# Patient Record
Sex: MALE | Race: BLACK OR AFRICAN AMERICAN
[De-identification: names, ages, dates, MRNs, and addresses within clinical notes are randomized per-mention and may not be internally consistent; named-entity substitution may affect disease eponyms.]

---

## 2022-02-28 ENCOUNTER — HOSPITAL ENCOUNTER (OUTPATIENT)
Dept: HOSPITAL 97 - ER | Age: 72
Setting detail: OBSERVATION
LOS: 1 days | Discharge: HOME | End: 2022-03-01
Attending: INTERNAL MEDICINE | Admitting: INTERNAL MEDICINE
Payer: COMMERCIAL

## 2022-02-28 VITALS — BODY MASS INDEX: 25.4 KG/M2

## 2022-02-28 DIAGNOSIS — J90: ICD-10-CM

## 2022-02-28 DIAGNOSIS — I10: ICD-10-CM

## 2022-02-28 DIAGNOSIS — E11.9: ICD-10-CM

## 2022-02-28 DIAGNOSIS — Z20.822: ICD-10-CM

## 2022-02-28 DIAGNOSIS — R07.89: Primary | ICD-10-CM

## 2022-02-28 DIAGNOSIS — E78.5: ICD-10-CM

## 2022-02-28 LAB
ALBUMIN SERPL BCP-MCNC: 3.9 G/DL (ref 3.4–5)
ALP SERPL-CCNC: 106 U/L (ref 45–117)
ALT SERPL W P-5'-P-CCNC: 24 U/L (ref 12–78)
AST SERPL W P-5'-P-CCNC: 11 U/L (ref 15–37)
BUN BLD-MCNC: 17 MG/DL (ref 7–18)
GLUCOSE SERPLBLD-MCNC: 179 MG/DL (ref 74–106)
HCT VFR BLD CALC: 46.3 % (ref 39.6–49)
INR BLD: 1.1
LYMPHOCYTES # SPEC AUTO: 5.1 K/UL (ref 0.7–4.9)
MAGNESIUM SERPL-MCNC: 2.4 MG/DL (ref 1.8–2.4)
NT-PROBNP SERPL-MCNC: 22 PG/ML (ref ?–125)
PMV BLD: 8.6 FL (ref 7.6–11.3)
POTASSIUM SERPL-SCNC: 4.3 MMOL/L (ref 3.5–5.1)
RBC # BLD: 5.16 M/UL (ref 4.33–5.43)
SARS-COV-2 RNA RESP QL NAA+PROBE: NEGATIVE
TROPONIN I SERPL HS-MCNC: 5.4 PG/ML (ref ?–58.9)

## 2022-02-28 PROCEDURE — 71045 X-RAY EXAM CHEST 1 VIEW: CPT

## 2022-02-28 PROCEDURE — 85025 COMPLETE CBC W/AUTO DIFF WBC: CPT

## 2022-02-28 PROCEDURE — 93005 ELECTROCARDIOGRAM TRACING: CPT

## 2022-02-28 PROCEDURE — 82947 ASSAY GLUCOSE BLOOD QUANT: CPT

## 2022-02-28 PROCEDURE — 99285 EMERGENCY DEPT VISIT HI MDM: CPT

## 2022-02-28 PROCEDURE — 96374 THER/PROPH/DIAG INJ IV PUSH: CPT

## 2022-02-28 PROCEDURE — 96375 TX/PRO/DX INJ NEW DRUG ADDON: CPT

## 2022-02-28 PROCEDURE — 85610 PROTHROMBIN TIME: CPT

## 2022-02-28 PROCEDURE — 71275 CT ANGIOGRAPHY CHEST: CPT

## 2022-02-28 PROCEDURE — 83735 ASSAY OF MAGNESIUM: CPT

## 2022-02-28 PROCEDURE — 82565 ASSAY OF CREATININE: CPT

## 2022-02-28 PROCEDURE — 80053 COMPREHEN METABOLIC PANEL: CPT

## 2022-02-28 PROCEDURE — 80048 BASIC METABOLIC PNL TOTAL CA: CPT

## 2022-02-28 PROCEDURE — 82550 ASSAY OF CK (CPK): CPT

## 2022-02-28 PROCEDURE — 80076 HEPATIC FUNCTION PANEL: CPT

## 2022-02-28 PROCEDURE — 36415 COLL VENOUS BLD VENIPUNCTURE: CPT

## 2022-02-28 PROCEDURE — 76705 ECHO EXAM OF ABDOMEN: CPT

## 2022-02-28 PROCEDURE — 82553 CREATINE MB FRACTION: CPT

## 2022-02-28 PROCEDURE — 0240U: CPT

## 2022-02-28 PROCEDURE — 83880 ASSAY OF NATRIURETIC PEPTIDE: CPT

## 2022-02-28 PROCEDURE — 84484 ASSAY OF TROPONIN QUANT: CPT

## 2022-02-28 RX ADMIN — CEFTRIAXONE SCH MLS: 1 INJECTION, POWDER, FOR SOLUTION INTRAMUSCULAR; INTRAVENOUS at 18:00

## 2022-02-28 RX ADMIN — COLCHICINE SCH MG: 0.6 TABLET, FILM COATED ORAL at 23:29

## 2022-02-28 RX ADMIN — HUMAN INSULIN SCH UNIT: 100 INJECTION, SOLUTION SUBCUTANEOUS at 21:00

## 2022-02-28 RX ADMIN — Medication SCH ML: at 21:00

## 2022-02-28 RX ADMIN — ENOXAPARIN SODIUM SCH MG: 40 INJECTION SUBCUTANEOUS at 18:00

## 2022-02-28 NOTE — EDPHYS
Physician Documentation                                                                           

 [facility]                                                                 

Name: Jozef Langley                                                                                

Age: 71 yrs                                                                                       

Sex: Male                                                                                         

: 1950                                                                                   

MRN: [id number]                                                                                   

Arrival Date: 2022                                                                          

Time: 13:33                                                                                       

Account#: [id number]                                                                            

Bed 6                                                                                             

Private MD: Sharda Babb                                                                              

ED Physician Leonid Narayanan                                                                      

HPI:                                                                                              

                                                                                             

13:56 This 71 yrs old Black Male presents to ER via Ambulatory with complaints of Chest Pain. jmm 

13:56 The patient or guardian reports chest pain that is located primarily in the substernal  [facility] 

      area. Onset: gradually, 3 day(s) ago. The pain radiates to Right-sided. Associated          

      signs and symptoms: Pertinent positives: shortness of breath. This is a 71-year-old         

      male with history of diabetes mellitus, hypertension the presents emerged part with         

      complaints of right-sided chest pain beginning approximately 3 to 4 days ago. Patient       

      states that the pain radiates into the mid substernal region. Patient is a      

      states he has chronic lower extremity swelling . Patient is never seen a cardiologist       

      with his PCP..                                                                              

                                                                                                  

Historical:                                                                                       

- Allergies:                                                                                      

13:45 No Known Allergies;                                                                     [facility] 

- PMHx:                                                                                           

13:45 Diabetes mellitus; Hypertensive disorder;                                               [facility] 

                                                                                                  

- Immunization history:: Client reports receiving the 2nd dose of the Covid vaccine.              

- Social history:: Smoking status: Patient denies any tobacco usage or history of.                

                                                                                                  

                                                                                                  

ROS:                                                                                              

13:56 Constitutional: Negative for fever, chills, and weight loss.                            jmm 

13:56 Cardiovascular: Positive for chest pain.                                                    

13:56 Respiratory: Positive for shortness of breath.                                              

13:56 All other systems are negative.                                                             

                                                                                                  

Exam:                                                                                             

13:56 Constitutional:  This is a well developed, well nourished patient who is awake, alert,  jmm 

      and in no acute distress. Head/Face:  atraumatic. Eyes:  EOMI, no conjunctival erythema     

      appreciated ENT:  Moist Mucus Membranes Neck:  Trachea midline, Supple Cardiovascular:      

      Regular rate and rhythm.  No edema appreciated                                              

13:56 Respiratory:  Normal respirations, no respiratory distress appreciated Abdomen/GI:  Non     

      distended, soft Back:  Normal ROM Skin:  General appearance color normal MS/ Extremity:     

       Moves all extremities, no obvious deformities appreciated, no edema noted to the lower     

      extremities  Neuro:  Awake and alert Psych:  Behavior is normal, Mood is normal,            

      Patient is cooperative and pleasant                                                         

13:56 Chest/axilla: Palpation: tenderness, that is moderate, of the  right lateral anterior       

      chest.                                                                                      

                                                                                                  

Vital Signs:                                                                                      

13:42  / 81; Pulse 122; Resp 20; Temp 97.9(TE); Pulse Ox 100% ; Weight 83.01 kg; Height 6 

      5 ft. 8 in. (172.72 cm); Pain 5/10;                                                         

14:00  / 78; Pulse 116; Resp 18; Temp 98.5; Pulse Ox 97% on R/A;                        ke1 

16:00  / 82; Pulse 103; Resp 22; Pulse Ox 99% on R/A;                                   ke1 

17:30  / 101; Pulse 92; Resp 19; Pulse Ox 99% on R/A;                                   ke1 

18:30  / 71; Pulse 103; Resp 16; Pulse Ox 98% on R/A;                                   st1 

20:20  / 77; Pulse 95; Resp 16; Pulse Ox 95% on R/A;                                    st1 

22:21  / 81; Pulse 88; Resp 16; Pulse Ox 99% on R/A;                                    st1 

13:42 Body Mass Index 27.82 (83.01 kg, 172.72 cm)                                             jh6 

                                                                                                  

MDM:                                                                                              

13:56 Patient medically screened.                                                             jake 

16:44 Data reviewed: vital signs, nurses notes. Counseling: I had a detailed discussion with  johanna 

      the patient and/or guardian regarding: the historical points, exam findings, and any        

      diagnostic results supporting the discharge/admit diagnosis, lab results, radiology         

      results, the need for further work-up and treatment in the hospital. ED course: I           

      discussed the patient with Dr. Walden whom accepted the patient to his service. .         

                                                                                                  

                                                                                             

14:05 Order name: Basic Metabolic Panel; Complete Time: 15:20                                 [facility] 

                                                                                             

14:05 Order name: CBC with Diff; Complete Time: 15:20                                         [facility] 

                                                                                             

14:05 Order name: LFT's; Complete Time: 15:20                                                 [facility] 

                                                                                             

14:05 Order name: Magnesium; Complete Time: 15:20                                             [facility] 

                                                                                             

14:05 Order name: NT PRO-BNP; Complete Time: 15:20                                            [facility] 

                                                                                             

14:05 Order name: PT-INR; Complete Time: 15:20                                                [facility] 

                                                                                             

14:05 Order name: Troponin HS; Complete Time: 15:20                                           [facility] 

                                                                                             

14:05 Order name: XRAY Chest (1 view); Complete Time: 15:21                                   [facility] 

                                                                                             

14:06 Order name: COVID-19/FLU A+B (Document "Date of Onset" if Symptomatic)                  [facility] 

                                                                                             

15:25 Order name: CREATININE WHOLE BLOOD; Complete Time: 15:43                                [facility]

                                                                                             

16:25 Order name: SARS-COV-2 RT PCR (Document "Date of Onset" if Symptomatic)                 [facility] 

                                                                                             

16:55 Order name: CKMB Creatine Kinase MB                                                     [facility]

                                                                                             

16:55 Order name: Creatine Phosphokinase                                                      [facility]

                                                                                             

22:56 Order name: Glucose, Ancillary Testing                                                  [facility]

                                                                                             

14:05 Order name: EKG; Complete Time: 14:06                                                   [facility] 

                                                                                             

14:05 Order name: Cardiac monitoring; Complete Time: 14:06                                    [facility] 

                                                                                             

14:06 Order name: EKG - Nurse/Tech; Complete Time: 14:06                                      [facility] 

                                                                                             

14:06 Order name: IV Saline Lock; Complete Time: 14:55                                        [facility] 

                                                                                             

14:06 Order name: Labs collected and sent; Complete Time: 14:55                               [facility] 

                                                                                             

14:06 Order name: O2 Per Protocol; Complete Time: 14:06                                       [facility] 

                                                                                             

14:06 Order name: O2 Sat Monitoring; Complete Time: 14:06                                     [facility] 

                                                                                             

14:18 Order name: CT Chest For PE Angio; Complete Time: 15:20                                 [facility] 

                                                                                             

15:57 Order name: US Abdomen Limited; Complete Time: 16:48                                    [facility] 

                                                                                             

16:55 Order name: 60g Consistent Carbohydrate (ADA 1800/)                                 [facility]

                                                                                                  

Administered Medications:                                                                         

14:54 Drug: morphine 2 mg Route: IVP; Site: left antecubital;                                 ke1 

15:15 Follow up: Response: Pain is decreased                                                  ke1 

14:54 Drug: Zofran (Ondansetron) 4 mg Route: IVP; Site: left antecubital;                     ke1 

15:15 Follow up: Response: No adverse reaction                                                ke1 

16:06 Drug: morphine 4 mg Route: IVP; Site: left antecubital;                                 ke1 

17:33 Follow up: Response: Pain is decreased; RASS: Alert and Calm (0)                        ke1 

17:29 Drug: Aspirin Chewable Tablet 324 mg Route: PO;                                         ke1 

                                                                                                  

                                                                                                  

Disposition Summary:                                                                              

22 16:45                                                                                    

Hospitalization Ordered                                                                           

      Hospitalization Status: Observation                                                     [facility] 

      Provider: Juan Walden 

      Condition: Stable                                                                       jmm 

      Problem: new                                                                            jmm 

      Symptoms: are unchanged                                                                 jmm 

      Bed/Room Type: Standard                                                                 jmm 

      Location: Telemetry/MedSurg (observation)(22 21:04)                               mw  

      Room Assignment: 419(22 21:04)                                                      

      Diagnosis                                                                                   

        - Chest pain, unspecified                                                             jmm 

      Forms:                                                                                      

        - Medication Reconciliation Form                                                      jmm 

        - SBAR form                                                                           jmm 

Addendum:                                                                                         

2022                                                                                        

     09:24 Co-signature as Attending Physician, Leonid Narayanan MD I agree with the assessment and  c
ha

           plan of care.                                                                          

                                                                                                  

Signatures:                                                                                       

Dispatcher [employer]                           EDMS                                                 

Puja Dominguez Martha, RN                        RN   Leonid Gibbs MD MD cha Mickail, Joel PA                       PA   Zoila Garza RN                   RN   jh6                                                  

Rio Silva RN                   RN   ke1                                                  

                                                                                                  

Corrections: (The following items were deleted from the chart)                                    

                                                                                             

17:42 16:45 Telemetry/MedSurg (observation) [facility]                                               bd  

17:42 16:45 jmm                                                                               bd  

21:04 17:42 [facility] ER HOLD bd                                                                   mw  

21:04 17:42 ERHOLD- bd                                                                        mw  

                                                                                                  

**************************************************************************************************

## 2022-02-28 NOTE — P.HP
Certification for Inpatient


Patient admitted to: Observation


With expected LOS: >2 Midnights


Patient will require the following post-hospital care: None


Practitioner: I am a practitioner with admitting privileges, knowledge of 

patient current condition, hospital course, and medical plan of care.


Services: Services provided to patient in accordance with Admission requirements

found in Title 42 Section 412.3 of the Code of Federal Regulations





Patient History


Date of Service: 02/28/22


Reason for admission: Substernal chest pain


History of Present Illness: 





71-year-old male with past medical history of hypertension, diabetes mellitus 

type 2, hyperlipidemia, no previous cardiac history presented because of 

substernal chest pain radiating to the right side since the last 3 days.  Pain 

is intermittent, no associated palpitation, no prior history of heart burn.  On 

arrival in the ED, vitals were stable, EKG was unremarkable with normal sinus 

rhythm and no EKG changes.  Chest x-ray shows no acute infiltrate.  CT shows no 

evidence of PE but small right-sided pleural effusion.  Patient admits to 

intermittent lower extremity swelling.  He denies any exertional dyspnea or 

paroxysmal nocturnal dyspnea. patient received 2 doses of COVID vaccine.  proBNP

was normal at 22.  He has been admitted for rule out acute coronary syndrome.





- Past Medical/Surgical History


Has patient received pneumonia vaccine in the past: No


Diabetic: No


Past Medical History: Reviewed- Non-Contributory


-: Hypertension


-: Diabetes mellitus


Past Surgical History: Reviewed- Non-Contributory





- Family History


Family History: Reviewed- Non-Contributory





- Social History


Smoking Status: Current every day smoker


Counseled patient to stop smoking for: less than 10 minutes


Smoking therapy provided: No


Patient receptive to therapy: No


Alcohol use: No


Place of Residence: Home





Review of Systems


10-point ROS is otherwise unremarkable





Physical Examination





- Physical Exam


General: Alert, In no apparent distress, Oriented x3


HEENT: Atraumatic, Normocephalic


Neck: 2+ carotid pulse no bruit, JVD not distended


Respiratory: Clear to auscultation bilaterally, Normal air movement


Cardiovascular: No edema, Regular rate/rhythm, Normal S1 S2


Gastrointestinal: Normal bowel sounds, Soft and benign, Non-distended


Musculoskeletal: No clubbing, No swelling


Integumentary: No rashes, No breakdown, No significant lesion


Neurological: Normal speech, Normal strength at 5/5 x4 extr, Normal tone, 

Sensation intact


External genitalia: No edema, No lesions





- Studies


Laboratory Data (last 24 hrs)





02/28/22 14:45: PT 12.7 H, INR 1.10


02/28/22 14:45: WBC 13.60 H, Hgb 14.9, Hct 46.3, Plt Count 316


02/28/22 14:45: Sodium 137, Potassium 4.3, BUN 17, Creatinine 1.12, Glucose 179 

H, Magnesium 2.4, Total Bilirubin 0.3, AST 11 L, ALT 24, Alkaline Phosphatase 

106








Assessment and Plan


Discharge Plan: Home





- Advance Directives


Does patient have a Living Will: No


Does patient have a Durable POA for Healthcare: No





- Code Status/Comfort Care


Code Status Assessed: Yes


Code Status: Full Code


Physician Review: Patient Assessed, Agree with Above Assessment and Plan


Physician Review Additional Text: 


All CT scans are performed using dose optimization technique as appropriate and 

may include automated exposure control or mA/KV adjustment according to patient 

size.


 


FINDINGS:  A pulmonary embolus is not seen.


 


A thoracic aortic aneurysm is not noted.


 


Small right pleural effusion. Mild right lower lobe atelectasis.


 


A pericardial effusion is not seen.


 


Paraseptal emphysema. The largest bleb measures 7 centimeters within left lung.


 


IMPRESSION:  Negative for a pulmonary embolism.


 








Impression


Atypical right-sided chest painpossibly related to pleurisy


Hypertension


Diabetes mellitus


Small right-sided pleural effusion





Plan


We will do serial set of cardiac enzymes although less likely coronary syndrome

at this time


We will start empirical antibiotics and colchicine twice daily for 2 to 3 days 

since possible pleurisy


Rule out COVID screen


If negative work-up in a.m. patient might benefit from outpatient 

echocardiogram


No urgent need for cardiology consult at this time but will follow


DVT prophylaxis with subcutaneous heparin

## 2022-02-28 NOTE — XMS REPORT
Continuity of Care Document

                          Created on:2022



Patient:GERALD LAM

Sex:Male

:1950

External Reference #:[id number]





Demographics







                          Address                   [address] [place] [address] [address] [place] [id number]

 

                          Home Phone                [phone] CELL

 

                          Work Phone                [phone]

 

                          Mobile Phone              [phone]

 

                          Email Address             NONE

 

                          Preferred Language        en

 

                          Marital Status            Unknown

 

                          Orthodoxy Affiliation     Unknown

 

                          Race                      Unknown

 

                          Additional Race(s)        Black or 



                                                    Unavailable



                                                    Unavailable

 

                          Ethnic Group              Unknown









Author







                          Organization              [facility]

t

 

                          Address                   [address]



                                                    [place] [address]

 

                          Phone                     [phone]









Support







                Name            Relationship    Address         Phone

 

                SHIVAM          Spouse          Unavailable     [phone]

 

                Shivam          Unavailable     [address] [place] [address] [address] [phone]

4



                                                [place] [address] 

 

                SHIVAM          Unavailable     [address] [place] [address] [address] [phone]

9



                                                [place] [address] 









Care Team Providers







                    Name                Role                Phone

 

                    Beatriz Babb           Primary Care Physician [phone]

 

                    BIANKA Babb            Attending Clinician Unavailable

 

                    SAIDA              Attending Clinician Unavailable

 

                    KRYSTAL BUTLER        Attending Clinician Unavailable

 

                    Matias JEREZ,  Gene   Attending Clinician [phone]

 

                    Doctor Unassigned,  Name Attending Clinician Unavailable









Payers







           Payer Name Policy Type Policy Number Effective Date Expiration Date S

devi

 

           [facility] MEDICARE            [id number]  2020            



           ADVANTAGE HMO                       00:00:00              

 

           HELEN GIFFORD            [id number]  2019            



           PLUS CLASSIC/VALUE                       00:00:00              







Problems







       Condition Condition Condition Status Onset  Resolution Last   Treating Co

mments 

Source



       Name   Details Category        Date   Date   Treatment Clinician        



                                                 Date                 

 

       Chronic Chronic Disease Active 2021                             [facility]



       venous venous                                              [facility]



       insufficie insufficie               00:00:                             



       ncy    ncy                  00                                 

 

       Varicose Varicose Disease Active 2021                             [facility]



       veins of veins of               0-27                               Health



       both lower both lower               00:00:                             



       extremitie extremitie               00                                 



       s with s with                                                  



       inflammati inflammati                                                  



       on     on                                                      

 

       No known No known Disease                                           [facility]

rs



       active active                                                  ity of



       problems problems                                                  [facility]







Allergies, Adverse Reactions, Alerts







       Allergy Allergy Status Severity Reaction(s) Onset  Inactive Treating Comm

ents 

Source



       Name   Type                        Date   Date   Clinician        

 

       NO KNOWN Drug   Active                                           [facility]



       ALLERGIE Class                                                   ity of



       S                                                              [facility]







Social History







           Social Habit Start Date Stop Date  Quantity   Comments   Source

 

           History of                       Current smoker            [facility]

 of



           tobacco use                                             [facility]

 

           Alcohol intake                                             [facility]

 

           Tobacco use and 2021-10-19 2021-10-19 Smokeless tobacco            [facility]

 [facility]



           exposure   00:00:00   00:00:00   non-user              

 

           Alcohol Comment 2019 socially              [facility]

y of



                      00:00:00   00:00:00                         [facility]

 

           Sex Assigned At 1950                       [facility]



           Birth      00:00:00   00:00:00                         









                Smoking Status  Start Date      Stop Date       Source

 

                Ex-smoker       2021-10-19 00:00:00 2021-10-19 00:00:00 [facility]

h







Medications







       Ordered Filled Start  Stop   Current Ordering Indication Dosage Frequency

 Signature

                    Comments            Components          Source



     Medication Medication Date Date Medication? Clinician                (SIG) 

          



     Name Name                                                   

 

     enalapril      2021      Yes                 Q12H every 12           [facility]



     (Vasotec)      0-19                               (twelve)           Health



     20 MG      15:45:                               hours.           



     tablet      18                                                

 

     metFORMIN      2021      Yes                 Q12H every 12           [facility]



     (Glucophage      0-19                               (twelve)           Heal

th



     ) 1000 MG      15:45:                               hours.           



     tablet      18                                                

 

     propranolol      2021      Yes                 Q12H every 12           [facility]



     (Inderal)      0-19                               (twelve)           Health



     20 MG      15:45:                               hours.           



     tablet      18                                                

 

     tamsulosin      2021      Yes                      1 (one)           [facility]



     (Flomax)      0-19                               time each           Health



     0.4 MG 24      15:45:                               day at the           



     hr capsule      18                                 same time.           

 

     enalapril      2021      Yes                 Q12H every 12           [facility]



     (Vasotec)      0-19                               (twelve)           Health



     20 MG      15:45:                               hours.           



     tablet      18                                                

 

     metFORMIN      2021      Yes                 Q12H every 12           [facility]



     (Glucophage      0-19                               (twelve)           Heal

th



     ) 1000 MG      15:45:                               hours.           



     tablet      18                                                

 

     propranolol      2021      Yes                 Q12H every 12           [facility]



     (Inderal)      0-19                               (twelve)           Health



     20 MG      15:45:                               hours.           



     tablet      18                                                

 

     tamsulosin      2021      Yes                      1 (one)           [facility]



     (Flomax)      0-19                               time each           Health



     0.4 MG 24      15:45:                               day at the           



     hr capsule      18                                 same time.           

 

     atorvastati      2021      Yes            1{tbl} QD   Take 1           [facility]



     n (Lipitor)      0-19                               tablet by           Hea

lth



     10 MG      15:45:                               mouth 1           



     tablet      17                                 (one) time           



                                                  each day.           

 

     atorvastati      2021      Yes            1{tbl} QD   Take 1           [facility]



     n (Lipitor)      0-19                               tablet by           [facility]

lth



     10 MG      15:45:                               mouth 1           



     tablet      17                                 (one) time           



                                                  each day.           

 

     glimepiride            Yes                                     [facility]



     (Amaryl) 4      7-31                                              Health



     MG tablet      00:00:                                              



               00                                                

 

     glimepiride            Yes                                     [facility]



     (Amaryl) 4      7-31                                              Health



     MG tablet      00:00:                                              



               00                                                

 

     multivitami            Yes                      Take  by           Un

[facility]



     n with                                     mouth.           ity of



     minerals      21:25:                                              [place]



     (ONE-A-DAY                                                      Medical



     50 PLUS                                                        Branch



     ORAL)                                                        

 

     Cholecalcif            Yes                      Take  by           Un

[facility]



     yassine,                                     mouth.           ity of



     Vitamin D3,      21:25:                                              [place]



     3,000 unit      09                                                Medical



     Tab                                                         Branch

 

     vit B            Yes                      Take  by           [employer]



     complex                                     mouth.           ity of



     no.12/niaci      21:25:                                              [place]



     n,B3,      09                                                Medical



     (VITAMIN B                                                        Branch



     COMPLEX                                                        



     NO.12-NIACI                                                        



     N ORAL)                                                        

 

     omega-3            Yes                      Take  by           [employer]



     fatty                                     mouth.           ity of



     acids/dha/e      21:25:                                              [place]



     pa (MEGARED      09                                                Medical



     PLANT-OMEGA                                                        Branch



     -3 ORAL)                                                        

 

     multivitami            Yes                      Take  by           Un

surek[facility]



     n with                                     mouth.           ity of



     minerals      21:25:                                              [place]



     (ONE-A-DAY                                                      Medical



     50 PLUS                                                        Branch



     ORAL)                                                        

 

     Cholecalcif            Yes                      Take  by           Un

surekha



     yassine,                                     mouth.           ity of



     Vitamin D3,      21:25:                                              [place]



     3,000 unit      09                                                Medical



     Tab                                                         Branch

 

     vit B      2019-      Yes                      Take  by           [facility]



     complex                                     mouth.           ity of



     no.12/niaci      21:25:                                              [place]



     n,B3,      09                                                Medical



     (VITAMIN B                                                        Branch



     COMPLEX                                                        



     NO.12-NIACI                                                        



     N ORAL)                                                        

 

     omega-3      2019-0      Yes                      Take  by           [employer]



     fatty                                     mouth.           ity of



     acids/dha/e      21:25:                                              [place]



     pa (MEGARED      09                                                Medical



     PLANT-OMEGA                                                        Branch



     -3 ORAL)                                                        

 

     multivitami            Yes                      Take  by           Un

sure[facility]



     n with                                     mouth.           ity of



     minerals      21:25:                                              [place]



     (ONE-A-DAY      09                                                Medical



     50 PLUS                                                        Branch



     ORAL)                                                        

 

     Cholecalcif      2019-0      Yes                      Take  by           Un

surekha



     yassine,      7-                               mouth.           ity of



     Vitamin D3,      21:25:                                              [place]



     3,000 unit      09                                                Medical



     Tab                                                         Branch

 

     vit B      2019-0      Yes                      Take  by           [facility]



     complex      7-                               mouth.           ity of



     no.12/niaci      21:25:                                              [place]



     n,B3,      09                                                Medical



     (VITAMIN B                                                        Branch



     COMPLEX                                                        



     NO.12-NIACI                                                        



     N ORAL)                                                        

 

     omega-3      -0      Yes                      Take  by           Univer

s



     fatty      7-                               mouth.           ity of



     acids/dha/e      21:25:                                              [place]



     pa (MEGARED      09                                                Medical



     PLANT-OMEGA                                                        Branch



     -3 ORAL)                                                        

 

     multivitami      -0      Yes                      Take  by           Un

surek[facility]



     n with      7-                               mouth.           ity of



     minerals      21:25:                                              [place]



     (ONE-A-DAY      09                                                Medical



     50 PLUS                                                        Branch



     ORAL)                                                        

 

     Cholecalcif      0      Yes                      Take  by           Un

surek[facility]



     yassine,      7-                               mouth.           ity of



     Vitamin D3,      21:25:                                              [place]



     3,000 unit      09                                                Medical



     Tab                                                         Branch

 

     vit B      2019-0      Yes                      Take  by           [facility]



     complex      7-                               mouth.           ity of



     no.12/niaci      21:25:                                              [place]



     n,B3,      09                                                Medical



     (VITAMIN B                                                        Branch



     COMPLEX                                                        



     NO.12-NIACI                                                        



     N ORAL)                                                        

 

     omega-3      -      Yes                      Take  by           Univer

s



     fatty      7-09                               mouth.           ity of



     acids/dha/e      21:25:                                              [place]



     pa (MEGARED      09                                                Medical



     PLANT-OMEGA                                                        Branch



     -3 ORAL)                                                        

 

     aspirin      0      Yes                      Take  by           Univer

s



     (ASPIR-81      7-09                               mouth.           ity of



     ORAL)      21:08:                                              [address]

 

     aspirin      -0      Yes                      Take  by           Univer

s



     (ASPIR-81      7-09                               mouth.           ity of



     ORAL)      21:08:                                              [address]

 

     aspirin      -0      Yes                      Take  by           Univer

s



     (ASPIR-81      7-09                               mouth.           ity of



     ORAL)      21:08:                                              [address]

 

     aspirin      -0      Yes                      Take  by           Univer

s



     (ASPIR-81      7-09                               mouth.           ity of



     ORAL)      21:08:                                              [address]



                                                                 Branch

 

     atorvastati            Yes                      TAKE 1           Univ

ers



     n 10 mg      6-14                               TABLET BY           ity of



     tablet      00:00:                               MOUTH ONCE           [place]



               00                                 DAILY FOR           Medical



                                                  90 DAYS           Branch

 

     atorvastati            Yes                      TAKE 1           Univ

ers



     n 10 mg      6-14                               TABLET BY           ity of



     tablet      00:00:                               MOUTH ONCE           [place]



               00                                 DAILY FOR           Medical



                                                  90 DAYS           Branch

 

     atorvastati            Yes                      TAKE 1           Univ

ers



     n 10 mg      6-14                               TABLET BY           ity of



     tablet      00:00:                               MOUTH ONCE           [place]



                                                DAILY FOR           Medical



                                                  90 DAYS           Branch

 

     atorvastati            Yes                      TAKE 1           Univ

ers



     n 10 mg      6-14                               TABLET BY           ity of



     tablet      00:00:                               MOUTH ONCE           [place]



               00                                 DAILY FOR           Medical



                                                  90 DAYS           Branch

 

     propranolol            Yes                                     Univer

s



     20 mg      5-07                                              ity of



     tablet      00:00:                                              [place]



                                                               [facility]

 

     propranolol      -0      Yes                                     Univer

s



     20 mg      5-07                                              ity of



     tablet      00:00:                                              [place]



               00                                                [facility]

 

     propranolol            Yes                                     Univer

s



     20 mg      5-07                                              ity of



     tablet      00:00:                                              [place]



               00                                                [facility]

 

     propranolol            Yes                                     Univer

s



     20 mg      5-07                                              ity of



     tablet      00:00:                                              [place]



                                                               [facility]

 

     enalapril            Yes                                     [facility]



     (VASOTEC)      8-15                                              ity of



     20 mg      00:00:                                              [place]



     tablet                                                      [facility]

 

     glimepiride            Yes                                     Univer

s



     (AMARYL) 4      8-15                                              ity of



     mg tablet      00:00:                                              [place]



                                                               [facility]

 

     metFORMIN            Yes                                     [facility]



     (GLUCOPHAGE      8-15                                              ity of



     ) 1,000 mg      00:00:                                              [place]



     tablet                                                      [facility]

 

     enalapril            Yes                                     [facility]



     (VASOTEC)      8-15                                              ity of



     20 mg      00:00:                                              [place]



     tablet      00                                                [facility]

 

     glimepiride            Yes                                     Univer

s



     (AMARYL) 4      8-15                                              ity of



     mg tablet      00:00:                                              [place]



                                                               [facility]

 

     metFORMIN            Yes                                     [facility]



     (GLUCOPHAGE      8-15                                              ity of



     ) 1,000 mg      00:00:                                              [place]



     tablet      00                                                [facility]

 

     enalapril            Yes                                     [facility]



     (VASOTEC)      8-15                                              ity of



     20 mg      00:00:                                              [place]



     tablet      00                                                [facility]

 

     glimepiride            Yes                                     Univer

s



     (AMARYL) 4      8-15                                              ity of



     mg tablet      00:00:                                              [place]



                                                               [facility]

 

     metFORMIN      0      Yes                                     [facility]



     (GLUCOPHAGE      8-15                                              ity of



     ) 1,000 mg      00:00:                                              [place]



     tablet      00                                                [facility]

 

     enalapril            Yes                                     [facility]



     (VASOTEC)      8-15                                              ity of



     20 mg      00:00:                                              [place]



     tablet      00                                                [facility]

 

     glimepiride            Yes                                     Univer

s



     (AMARYL) 4      8-15                                              ity of



     mg tablet      00:00:                                              [place]



               00                                                [facility]



                                                                 Branch

 

     metFORMIN            Yes                                     [facility]



     (GLUCOPHAGE      8-15                                              ity of



     ) 1,000 mg      00:00:                                              [place]



     tablet                                                      Medical



                                                                 Branch

 

     Propranolol Propranolol           Yes  Na [facility]                1 tablet     

      [facility]



     HCl  HCl                                                    [facility]



                                                                 [facility]



                                                                 [facility]pati



                                                                 ent



                                                                 Clinics

 

     Metformin Metformin           Yes  [facility]                1 tablet         

  [facility]



     HCl  HCl                                     with meals           [facility]



                                                                 [facility]



                                                                 Outpati



                                                                 ent



                                                                 Clinics

 

     Tamsulosin Tamsulosin           Yes  [facility]                1 capsule      

     [facility]



     HCl  HCl                                                    [facility]



                                                                 [facility]



                                                                 Outpati



                                                                 ent



                                                                 Clinics

 

     Glyxambi Glyxambi           Yes  [facility]                1              [facility]



                                                                 [facility]



                                                                 [facility]



                                                                 [facility]pati



                                                                 ent



                                                                 Clinics

 

     Pioglitazon Pioglitazon           Yes  [facility]                1 tablet     

      [facility]



     e HCl e HCl                                                   [facility]



                                                                 [facility]



                                                                 Outpati



                                                                 ent



                                                                 Clinics

 

     Enalapril Enalapril           Yes  [facility]                1 tablet         

  [facility]



     Maleate Maleate                                                   [facility]



                                                                 [facility]



                                                                 Outpati



                                                                 ent



                                                                 Clinics

 

     Glimepiride Glimepiride           Yes  Na [facility]                1 tablet     

      [facility]



                                                  with           [facility]



                                                  breakfast           [facility]



                                                  or the           l



                                                  first main           Outpati



                                                  meal of           ent



                                                  the day           Clinics

 

     Myrbetriq Myrbetriq           Yes  [facility]                1 tablet         

  [facility]



                                                                 [facility]



                                                                 l



                                                                 Outpati



                                                                 ent



                                                                 Clinics

 

     Amaryl Amaryl           Yes  [facility]                1 tablet           [facility]



                                                  with [facility]           [facility]



                                                                 l



                                                                 Outpati



                                                                 ent



                                                                 Clinics

 

     Gabapentin Gabapentin           Yes  [facility]                1 capsule      

     [facility]



                                                                 [facility]



                                                                 l



                                                                 Outpati



                                                                 ent



                                                                 Clinics

 

     Finasteride Finasteride           Yes  [facility]                1 tablet     

      [facility]



                                                                 [facility]



                                                                 l



                                                                 Outpati



                                                                 ent



                                                                 Clinics

 

     Gabapentin Gabapentin           Yes  Na [facility]                1 capsule      

     [facility]



                                                                 [facility]



                                                                 l



                                                                 Outpati



                                                                 ent



                                                                 Clinics

 

     Atorvastati Atorvastati           Yes  [facility]                1 tablet     

      [facility]



     n Calcium n Calcium                                                   [facility]



                                                                 l



                                                                 Outpati



                                                                 ent



                                                                 Clinics

 

     SMZ-TMP DS SMZ-TMP DS           Yes  [facility]                not            

[facility]



                                                  defined           [facility]



                                                                 l



                                                                 Outpati



                                                                 ent



                                                                 Clinics







Immunizations







           Ordered    Filled Immunization Date       Status     Comments   [facility]

e



           Immunization Name Name                                        

 

           FluAD      FluAD      2019 Completed             [facility]



                                 [facility]00                         [facility]



                                                                  Out[facility]



                                                                  Clinics

 

           Prevnar 13 Prevnar 13 2019 Completed             [facility]



           -Pneumonia Vaccine -Pneumonia Vaccine 00:00:00                       

  [facility]







Vital Signs







             Vital Name   Observation Time Observation Value Comments     Source

 

             Systolic blood 2019 20:42:00 123 mm[Hg]                Univer

sity of



             pressure                                            [facility]

 

             Diastolic blood 2019 20:42:00 80 mm[Hg]                 Unive

rsity [facility]

 

             Heart rate   2019 20:42:00 101 /min                  [facility]

 

             Body temperature 2019 20:42:00 36.67 Joan                 [facility]

 

             Respiratory rate 2019 20:42:00 16 /min                   [facility]

 

             Body height  2019 20:42:00 177.8 cm                  [facility]

 

             Body weight  2019 20:42:00 82.271 kg                 [facility]

 

             BMI          2019 20:42:00 26.02 kg/m2               [facility]







Procedures







                Procedure       Date / Time Performed Performing Clinician Sourc

e

 

                MR BRAIN WO CONTRAST 2019 15:25:00 Nelson Salcedo

[facility]

 

                ASSIGNMENT OF BENEFITS 2019 14:05:46 Doctor Unassigned, No

 [facility]







Encounters







        Start   End     Encounter Admission Attending Care    Care    Encounter 

Source



        Date/Time Date/Time Type    Type    Clinicians Facility Department ID   

   

 

        2022         Outpatient         Babb, Na [facility]  [facility]  [id number]

[address] [facility]



        14:16:00                                                 [id number]   [facility]



                                                                        Outpati



                                                                        ent



                                                                        Clinics

 

        2022         Outpatient         Babb, Na [facility]  [facility]  [id number]

[address] [facility]



        14:01:34                                                 [id number]   [facility]



                                                                        [facility]



                                                                        Outpati



                                                                        ent



                                                                        Clinics

 

        2022         Outpatient         Babb, Na [facility]  [facility]  [id number]

[address] [facility]



        13:44:11                                                 [id number]   [facility]



                                                                        [facility]



                                                                        Outpati



                                                                        ent



                                                                        Clinics

 

        2022         Outpatient         Babb, Na [facility]  [facility]  [id number]

[address] [facility]



        13:39:45                                                 [id number]   [facility]



                                                                        [facility]



                                                                        Outpati



                                                                        ent



                                                                        Clinics

 

        2022         Outpatient         Babb, Na [facility]  [facility]  [id number]

[address] [facility]



        12:29:08                                                 [id number]   [facility]



                                                                        [facility]



                                                                        Outpati



                                                                        ent



                                                                        Clinics

 

        2022         Outpatient         Babb, Na [facility]  [facility]  [id number]

[address] [facility]



        12:27:34                                                 [id number]   [facility]



                                                                        [facility]



                                                                        Outpati



                                                                        ent



                                                                        Clinics

 

        2022         Outpatient         Babb, Na [facility]  [facility]  [id number]

[address] [facility]



        12:04:29                                                 [id number]   [facility]



                                                                        [facility]



                                                                        Outpati



                                                                        ent



                                                                        Clinics

 

        2022         Outpatient         Babb, Na [facility]  [facility]  [id number]

[address] [facility]



        12:02:37                                                 [id number]   [facility]



                                                                        [facility]



                                                                        Outpati



                                                                        ent



                                                                        Clinics

 

        2022         Outpatient         Bbab, Na [facility]  [facility]  [id number]

[address] [facility]



        11:35:04                                                 [id number]   [facility]



                                                                        [facility]



                                                                        Outpati



                                                                        ent



                                                                        Clinics

 

        2022         Outpatient         Babb, Na [facility]  [facility]  [id number]

[address] [facility]



        11:20:33                                                 [id number]   [facility]



                                                                        [facility]



                                                                        Outpati



                                                                        ent



                                                                        Clinics

 

        2022         Outpatient         Skinny, Na [facility]  [facility]  [id number]

[address] [facility]



        11:05:46                                                 [id number]   [facility]



                                                                        [facility]



                                                                        Outpati



                                                                        ent



                                                                        Clinics

 

        2022         Outpatient         Babb, Na [facility]  [facility]  [id number]

[address] [facility]



        11:04:11                                                 [id number]   [facility]



                                                                        Outpati



                                                                        ent



                                                                        Clinics

 

        2021         Outpatient                 [facility]     [id number] 

[place]



        11:41:32                                                         [facility]

 

        2021-10-19         Outpatient                 [facility]     [id number] 

[place]



        12:03:23                                                         Health

 

        2021-10-19         Outpatient                 [facility]     [id number] 

[place]



        12:01:39                                                         Health

 

        2021         Outpatient         SAIDA, [facility]     [id number] 

[place]



        13:11:05                         [facility]

 

        2022 ambulatory                 [facility]  [facility]  [id number]

 [facility]



        00:00:00 00:00:00                                                 [facility]



                                                                        [facility]



                                                                        Outpati



                                                                        ent



                                                                        Clinics

 

        2022 ambulatory                 [facility]  [facility]  [id number]

 [facility]



        00:00:00 00:00:00                                                 [facility]



                                                                        [facility]



                                                                        [facility]pati



                                                                        ent



                                                                        Clinics

 

        2021 ambulatory                 [facility]  [facility]  [id number]

 [facility]



        00:00:00 00:00:00                                                 [facility]



                                                                        [facility]



                                                                        Outpati



                                                                        ent



                                                                        Clinics

 

        2021 ambulatory                 [facility]  [facility]  [id number]

 [facility]



        00:00:00 00:00:00                                                 [facility]



                                                                        [facility]



                                                                        Outpati



                                                                        ent



                                                                        Clinics

 

        2021 ambulatory                 [facility]  [facility]  [id number]

 [facility]



        00:00:00 00:00:00                                                 [facility]



                                                                        [facility]



                                                                        Outpati



                                                                        ent



                                                                        Clinics

 

        2021 ambulatory                 [facility]  [facility]  [id number]

 [facility]



        00:00:00 00:00:00                                                 [facility]



                                                                        [facility]



                                                                        Outpati



                                                                        ent



                                                                        Clinics

 

        2021 ambulatory                 [facility]  [facility]  [id number]

 [facility]



        00:00:00 00:00:00                                                 [facility]



                                                                        [facility]



                                                                        Outpati



                                                                        ent



                                                                        Clinics

 

        2021 Office          ARMAND Choudhary [facility] [id number]

[address] [place]



        12:20:17 12:23:23 Visit           Seyd  CASTRO    [id number]         Michael LONGO [id number]         



                                                CLINIC  [phone]         



                                                        1               

 

        2021-10-19 2021-10-19 Office          ARMAND Choudhary [facility] [id number]

[address] [place]



        11:37:35 12:08:38 Visit           Syed KNUTSON  [id number]         Michael QUIROZ 1 [id number]         



                                                        [phone]         



                                                        2               

 

        2021-10-12 2021-10-12 ambulatory                 [facility] [facility]  [facility]  [id number]

 [facility]



        [facility]00                                                 [facility]



                                                                        [facility]



                                                                        Outpati



                                                                        ent



                                                                        Clinics

 

        2021 Outpatient                 [facility]  [facility]  [id number]

 [facility]



        00:[facility]00 [facility]00[facility]00                                                 [facility]



                                                                        l



                                                                        Outpati



                                                                        ent



                                                                        Clinics

 

        2021 Outpatient                 [facility]  [facility]  [id number]

 [facility]



        00:[facility]00 [facility]00[facility]00                                                 [facility]



                                                                        l



                                                                        Outpati



                                                                        ent



                                                                        Clinics

 

        2021 Outpatient                 [facility]  [facility]  [id number]

 [facility]



        00:[facility]00 [facility]00[facility]00                                                 [facility]



                                                                        l



                                                                        Outpati



                                                                        ent



                                                                        Clinics

 

        2021 Outpatient                 [facility]  [facility]  [id number]

 [facility]



        00:[facility]00 [facility]00[facility]00                                                 [facility]



                                                                        l



                                                                        Outpati



                                                                        ent



                                                                        Clinics

 

        2021 Outpatient                 [facility]  [facility]  [id number]

 [facility]



        00:00[facility]00 [facility]00[facility]00                                                 [facility]



                                                                        l



                                                                        Outpati



                                                                        ent



                                                                        Clinics

 

        2021 Outpatient                 [facility]  [facility]  [id number]

 [facility]



        00:00[facility]00 [facility]00[facility]00                                                 [facility]



                                                                        l



                                                                        Outpati



                                                                        ent



                                                                        Clinics

 

        2021 Outpatient                 [facility]  [facility]  [id number]

 [facility]



        00:[facility]00 [facility]00[facility]00                                                 [facility]



                                                                        l



                                                                        Outpati



                                                                        ent



                                                                        Clinics

 

        2021 Outpatient                 [facility]  [facility]  [id number]

 [facility]



        00:00[facility]00 [facility]00[facility]00                                                 [facility]



                                                                        l



                                                                        Outpati



                                                                        ent



                                                                        Clinics

 

        2021 Outpatient                 [facility]  [facility]  [id number]

 [facility]



        00:00[facility]00 00[facility]00[facility]00                                                 [facility]



                                                                        l



                                                                        Outpati



                                                                        ent



                                                                        Clinics

 

        2021 Outpatient                 [facility]  [facility]  [id number]

 [facility]



        00:00[facility]00 00[facility]00[facility]00                                                 [facility]



                                                                        l



                                                                        Outpati



                                                                        ent



                                                                        Clinics

 

        2021 Outpatient                 [facility]    [id number]

228 [facility]



        15:10:00 15:10:00                                                 [facility]

 

        2021 Outpatient                 [facility]  [facility]  [id number]

 [facility]



        00:00[facility]00 00[facility]00:00                                                 [facility]



                                                                        l



                                                                        Outpati



                                                                        ent



                                                                        Clinics

 

        2021 Outpatient                 [facility]  [facility]  [id number]

 [facility]



        00:00[facility]00 00[facility]00:00                                                 [facility]



                                                                        l



                                                                        Outpati



                                                                        ent



                                                                        Clinics

 

        2021 Outpatient ZONIA BUTLER, [facility]    [id number]

[id number] [facility]



        15:10:00 15:10:00                 RENATO                             [facility]

 

        2020 Outpatient                 [facility]  [facility]  [id number]

 [facility]



        00:00[facility]00 00:00:00                                                 [facility]



                                                                        l



                                                                        Outpati



                                                                        ent



                                                                        Clinics

 

        2020 Outpatient                 [facility]  [facility]  [id number]

 [facility]



        00:00[facility]00 00:00:00                                                 [facility]



                                                                        l



                                                                        Outpati



                                                                        ent



                                                                        Clinics

 

        2020-10-19 2020-10-19 Outpatient                 [facility]  [facility]  [id number]

 [facility]



        00:00[facility]00 00:00:00                                                 [facility]



                                                                        l



                                                                        Outpati



                                                                        ent



                                                                        Clinics

 

        2020-10-12 2020-10-12 Outpatient                 [facility]  [facility]  [id number]

 [facility]



        00:00[facility]00 00:00:00                                                 [facility]



                                                                        l



                                                                        Outpati



                                                                        ent



                                                                        Clinics

 

        2020 Outpatient                 [facility]

[id number] [facility]



        16:00:00 16:00:00                         t Listen Up

s -



                                                Drik   [facility]  Medicine         l



                                                Medicine                 Outpati



                                                                        ent



                                                                        Clinics

 

        2020 Outpatient                 [facility]

[id number] [facility]



        13:45:00 13:45:00                         t [facility]         l



                                                Medicine                 Outpati



                                                                        ent



                                                                        Clinics

 

        2020 Outpatient                 [facility]

[id number] [facility]



        16:40:00 16:40:00                         t Listen Up

s -



                                                Drik   [facility]  Medicine         l



                                                Medicine                 Outpati



                                                                        ent



                                                                        Clinics

 

        2020 Outpatient                 [facility]

[id number] [facility]



        16:00:00 16:00:00                         t [place]   [employer]

s -



                                                Drive   [facility]  Medicine         l



                                                Medicine                 Outpati



                                                                        ent



                                                                        Clinics

 

        2020 Outpatient                 [facility]

[id number] [facility]



        13:38:00 13:38:00                         t [place]   [employer]

s -



                                                Drive   [facility]  Medicine         l



                                                Medicine                 Outpati



                                                                        ent



                                                                        Clinics

 

        2020 Outpatient                 [facility]

[id number] [facility]



        14:56:00 14:56:00                         t Listen Up

s -



                                                Drive   [facility]  Medicine         l



                                                Medicine                 Outpati



                                                                        ent



                                                                        Clinics

 

        2019 Outpatient                 [facility]

[id number] [facility]



        15:40:00 15:40:00                         t [place]   [employer]

s -



                                                Drive   [facility]



                                                Medicine                 Outpati



                                                                        ent



                                                                        Clinics

 

        2019 Outpatient                 [facility]

[id number] [facility]



        11:01:00 11:01:00                         t [place]   [place] Drik         [employer]

s -



                                                Drive   [facility]



                                                Med[facility]                 [facility]



                                                                        ent



                                                                        Clinics

 

        2019 Outpatient                 [facility] [address]

[id number] [facility]



        10:42:00 10:42:00                         t Oak   [employer]         [employer]

s -



                                                Drive   [facility]                 [facility]



                                                                        ent



                                                                        Clinics

 

        2019 Outpatient                 [facility]

[id number] [facility]



        14:40:00 14:40:00                         t Oak   [employer]         [employer]

s -



                                                Drive   [facility]                 [facility]



                                                                        ent



                                                                        Clinics

 

        2019 Outpatient                 [facility]

[id number] [facility]



        16:00:00 16:00:00                         t Listen Up

s -



                                                Drik   [facility]                 [facility]



                                                                        ent



                                                                        [facility]

 

        [address] [place] [id number]

095 [facility]



        14:48:40 16:58:59 Visit           Nelson Garza [id number]      

   ity of



                                                Lamar [id number]         [facility] [phone]         [facility]     092             Branch



                                                [facility]                 

 

        2019 [facility]    [id number]

[id number] [facility]



        09:11:08 23:59:00 Encounter         Nelson Garza [id number]    

     ity of



                                                Lamar [id number]         [facility]  [phone]         Medi

kevin



                                                        804             Branch

 

        2019 Orders          Doctor LOPES    [id number]

69 [facility]



        00:00:00 00:00:00 Only            Unassigned, YADIEL   [id number]       

  ity of



                                        [facility] [id number]         Aba

as



                                                        [phone]         Medi

kevin



                                                        009             Branch

 

        2019 Outpatient                 [facility]

[id number] [facility]



        15:55:00 15:55:00                         t Listen Up

s -



                                                Drive   [facility]



                                                Medicine                 Outpati



                                                                        ent



                                                                        Clinics

 

        2019 Outpatient                 [facility]

[id number] [facility]



        16:44:00 16:44:00                         t Oak   [employer]

s -



                                                Drive   [facility]



                                                Medicine                 Outpati



                                                                        ent



                                                                        Clinics

 

        2019 Outpatient                 [facility]

[id number] [facility]



        09:18:00 09:18:00                         t Oak   [employer]

s -



                                                Drive   [facility]



                                                Medicine                 Outpati



                                                                        ent



                                                                        Clinics

 

        2019 Outpatient                 [facility]

[id number] [facility]



        15:00:00 15:00:00                         t [place]   [employer]

s -



                                                Drive   [facility]



                                                Medicine                 Outpati



                                                                        ent



                                                                        Clinics

 

        2019 Outpatient                 [facility]

[id number] [facility]



        10:45:00 10:45:00                         t Oak   [employer]

s -



                                                Drik   [facility]



                                                Medicine                 Outpati



                                                                        ent



                                                                        Clinics

 

        2019-01-10 2019-01-10 Outpatient                 [facility]

[id number] [facility]



        15:56:00 15:56:00                         t Oak   [employer]

s -



                                                Drive   [facility]



                                                Medicine                 Outpati



                                                                        ent



                                                                        Clinics

 

        2019-01-10 2019-01-10 Outpatient                 [facility]

[id number] [facility]



        13:29:00 13:29:00                         t Oak   [employer]

s -



                                                Drik   [facility]



                                                Medicine                 Outpati



                                                                        ent



                                                                        Clinics

 

        2018 Outpatient                 [facility]  [facility]  [id number]

 [facility]



        00:00:00 00:00:00                                                 [facility]



                                                                        Outpati



                                                                        ent



                                                                        Clinics

 

        2018 Outpatient                 [facility]

[id number] [facility]



        15:30:00 15:30:00                         t Listen Up

s -



                                                Drive   [facility]



                                                Medicine                 Outpati



                                                                        ent



                                                                        Clinics







Results







           Test Description Test Time  Test       Results    Result     Source



                                 Comments              Comments   

 

           MR BRAIN WO              HISTORY: Tremors. Rule            [facility]

[facility]



           CONTRAST   1                     out subthalamic CVA.            [place]

[facility]



                      15:42:56              TECHNIQUE: Routine MRI            Br

anch



                                            of the brain was            



                                            completed without            



                                            contrastinjection            



                                            technique. Examination            



                                            includes sagittal            



                                            T1/T2 FLAIR/3-D            



                                            FSPGR,axial T2 FLAIR,            



                                            DWI/ADC studies. From            



                                            the 3-D imaging,            



                                            subsequently axialand            



                                            coronal reformations            



                                            were generated. An            



                                            additional gradient            



                                            echo T2*axial study            



                                            was also obtained.            



                                            FINDINGS: Dual echo            



                                            DWI images showed no            



                                            focal areas of            



                                            restricted            



                                            protondiffusion,            



                                            therefore, recent            



                                            intracranial cytotoxic            



                                            event is not            



                                            suspected.Gradient-ech            



                                            o T2 star images            



                                            showed no focal areas            



                                            of abnormal            



                                            signalsuggestive of            



                                            hemosiderin effect or            



                                            abnormal mineral            



                                            deposition in thebasal            



                                            ganglia. T2 and FLAIR            



                                            images showed several            



                                            2 mm to 2 cm size            



                                            hyperintense lesionsin            



                                            both periventricular            



                                            as well as peripheral            



                                            white matter            



                                            involvingfrontal/parie            



                                            pro/occipital lobes.            



                                            One small 3 mm lacunar            



                                            infarction is seenin            



                                            the white matter            



                                            adjacent to the atrium            



                                            of left lateral            



                                            ventricle. Ventricular            



                                            system and cortical            



                                            sulci are within            



                                            normal limits for            



                                            thepatient's age. No            



                                            midline shift or            



                                            abnormal fluid            



                                            collection in            



                                            theextra-axial            



                                            compartment or            



                                            pressure effect of the            



                                            brain detected. Normal            



                                            signal void of major            



                                            intracranial vascular            



                                            structures is            



                                            preserved.Basal            



                                            ganglia, brain stem            



                                            and posterior fossa            



                                            structures including            



                                            internalauditory            



                                            canals and mastoids            



                                            appear normal.            



                                            Pituitary gland,            



                                            parasellar            



                                            andsuprasellar            



                                            regions, grossly            



                                            orbits and            



                                            retro-orbital regions            



                                            appearnormal. Note            



                                            made of mild changes            



                                            of chronic sinusitis            



                                            involving all            



                                            paranasalsinuses with            



                                            retained secretions in            



                                            the left maxillary            



                                            sinus raisingconcern            



                                            for superimposed mild            



                                            acute left maxillary            



                                            sinusitis.            



                                            CONCLUSION:1. No acute            



                                            intracranial            



                                            findings.2. 3 mm old            



                                            infarction in the            



                                            white matter of left            



                                            occipital lobe near            



                                            theatrium of lateral            



                                            ventricle.3.            



                                            Mild-to-moderate            



                                            ischemic white matter            



                                            degenerative changes            



                                            in theperiventricular            



                                            as well as peripheral            



                                            white matter of both            



                                            cerebralhemispheres,            



                                            possibly due to            



                                            combination of            



                                            systemic hypertension            



                                            as wellas small vessel            



                                            disease. Please            



                                            correlate with            



                                            history.       [facility],            



                                            Radiant Results Inft            



                                            User - 2019            



                                            10:43 AM CDTHISTORY:            



                                            Tremors. Rule out            



                                            subthalamic            



                                            CVA.TECHNIQUE: Routine            



                                            MRI of the brain was            



                                            completed without            



                                            contrastinjection            



                                            technique. Examination            



                                            includes sagittal            



                                            T1/T2 FLAIR/3-D            



                                            FSPGR,axial T2 FLAIR,            



                                            DWI/ADC studies. From            



                                            the 3-D imaging,            



                                            subsequently axialand            



                                            coronal reformations            



                                            were generated. An            



                                            additional gradient            



                                            echo T2*axial study            



                                            was also              



                                            obtained.FINDINGS:            



                                            Dual echo DWI images            



                                            showed no focal areas            



                                            of restricted            



                                            protondiffusion,            



                                            therefore, recent            



                                            intracranial cytotoxic            



                                            event is not            



                                            suspected.Gradient-ech            



                                            o T2 star images            



                                            showed no focal areas            



                                            of abnormal            



                                            signalsuggestive of            



                                            hemosiderin effect or            



                                            abnormal mineral            



                                            deposition in thebasal            



                                            ganglia.T2 and FLAIR            



                                            images showed several            



                                            2 mm to 2 cm size            



                                            hyperintense lesionsin            



                                            both periventricular            



                                            as well as peripheral            



                                            white matter            



                                            involvingfrontal/parie            



                                            pro/occipital lobes.            



                                            One small 3 mm lacunar            



                                            infarction is seenin            



                                            the white matter            



                                            adjacent to the atrium            



                                            of left lateral            



                                            ventricle.Ventricular            



                                            system and cortical            



                                            sulci are within            



                                            normal limits for            



                                            thepatient's age. No            



                                            midline shift or            



                                            abnormal fluid            



                                            collection in            



                                            theextra-axial            



                                            compartment or            



                                            pressure effect of the            



                                            brain detected.Normal            



                                            signal void of major            



                                            intracranial vascular            



                                            structures is            



                                            preserved.Basal            



                                            ganglia, brain stem            



                                            and posterior fossa            



                                            structures including            



                                            internalauditory            



                                            canals and mastoids            



                                            appear normal.            



                                            Pituitary gland,            



                                            parasellar            



                                            andsuprasellar            



                                            regions, grossly            



                                            orbits and            



                                            retro-orbital regions            



                                            appearnormal.Note made            



                                            of mild changes of            



                                            chronic sinusitis            



                                            involving all            



                                            paranasalsinuses with            



                                            retained secretions in            



                                            the left maxillary            



                                            sinus raisingconcern            



                                            for superimposed mild            



                                            acute left maxillary            



                                            sinusitis.CONCLUSION:1            



                                            . No acute            



                                            intracranial            



                                            findings.2. 3 mm old            



                                            infarction in the            



                                            white matter of left            



                                            occipital lobe near            



                                            theatrium of lateral            



                                            ventricle.3.            



                                            Mild-to-moderate            



                                            ischemic white matter            



                                            degenerative changes            



                                            in theperiventricular            



                                            as well as peripheral            



                                            white matter of both            



                                            cerebralhemispheres,            



                                            possibly due to            



                                            combination of            



                                            systemic hypertension            



                                            as wellas small vessel            



                                            disease. Please            



                                            correlate with            



                                            history.

## 2022-02-28 NOTE — ER
Nurse's Notes                                                                                     

 [facility]                                                                 

Name: Jozef Langley                                                                                

Age: 71 yrs                                                                                       

Sex: Male                                                                                         

: 1950                                                                                   

MRN: [id number]                                                                                   

Arrival Date: 2022                                                                          

Time: 13:33                                                                                       

Account#: [id number]                                                                            

Bed 6                                                                                             

Private MD: Sharda Babb                                                                              

Diagnosis: Chest pain, unspecified                                                                

                                                                                                  

Presentation:                                                                                     

                                                                                             

13:42 Chief complaint: Patient states: Pt reports sharp rt sided chest pain that is worse     jh6 

      with deep breath and movement. Sudden onset 2days ago that has been constant. no fever      

      or cough prior. Coronavirus screen: Vaccine status: Patient reports receiving the 2nd       

      dose of the covid vaccine. Ebola Screen: Patient denies travel to an Ebola-affected         

      area in the 21 days before illness onset. Initial Sepsis Screen: Does the patient meet      

      any 2 criteria? Mean Arterial Pressure (MAP) < 65. HR > 90 bpm. Does the patient have a     

      suspected source of infection? No. Patient's initial sepsis screen is negative. Risk        

      Assessment: Do you want to hurt yourself or someone else? Patient reports no desire to      

      harm self or others. Onset of symptoms was 2022.                               

13:42 Method Of Arrival: Ambulatory                                                           [facility] 

13:42 Acuity: NIMO 3                                                                           [facility] 

                                                                                                  

Triage Assessment:                                                                                

13:45 General: Appears in no apparent distress. Behavior is calm, cooperative. Pain:          [facility] 

      Complains of pain in right breast Pain currently is 5 out of 10 on a pain scale.            

      Quality of pain is described as sharp, shooting, Pain began suddenly, Is continuous,        

      Aggravated by exercise. Cardiovascular: Capillary refill < 3 seconds.                       

                                                                                                  

Historical:                                                                                       

- Allergies:                                                                                      

13:45 No Known Allergies;                                                                     jh 

- PMHx:                                                                                           

13:45 Diabetes mellitus; Hypertensive disorder;                                               6 

                                                                                                  

- Immunization history:: Client reports receiving the 2nd dose of the Covid vaccine.              

- Social history:: Smoking status: Patient denies any tobacco usage or history of.                

                                                                                                  

                                                                                                  

Screenin:17 Abuse screen: Denies threats or abuse. Nutritional screening: No deficits noted.        st1 

      Tuberculosis screening: No symptoms or risk factors identified. Fall Risk None              

      identified. No fall in past 12 months (0 pts). No secondary diagnosis (0 pts). IV           

      access (20 points). Ambulatory Aid- None/Bed Rest/Nurse Assist (0 pts). Gait-               

      Normal/Bed Rest/Wheelchair (0 pts) Mental Status- Oriented to own ability (0 pts).          

      Total Reyes Fall Scale indicates No Risk (0-24 pts).                                        

                                                                                                  

Assessment:                                                                                       

14:01 General: Appears in no apparent distress. Behavior is calm, cooperative. Pain:          ke1 

      Complains of pain in chest Pain currently is 5 out of 10 on a pain scale. at worst was      

      9 out of 10 on a pain scale. level that patient reports is acceptable is 5 out of 10 on     

      a pain scale. Alleviated by rest, Aggravated by exercise, repositioning. Neuro: Level       

      of Consciousness is awake, alert, Oriented to person, place, time, situation.               

      Cardiovascular: Reports chest pain, Heart tones S1 S2 Capillary refill < 3 seconds          

      Patient's skin is warm and dry. Pulses are all present. Respiratory: Airway is patent       

      Breath sounds are clear bilaterally. GI: Abdomen is non-distended. : No deficits          

      noted.                                                                                      

15:15 Reassessment: Patient states symptoms have improved. Pain: Pain currently is 4 out of   ke1 

      10 on a pain scale.                                                                         

16:00 Pain: Complains of pain in chest Pain currently is 7 out of 10 on a pain scale.         ke1 

16:35 Reassessment: Patient states symptoms have improved. Pain: Pain currently is 3 out of   ke1 

      10 on a pain scale.                                                                         

17:39 Reassessment: No changes from previously documented assessment.                         ke1 

19:30 Reassessment: Patient appears in no apparent distress at this time. Patient and/or      st1 

      family updated on plan of care and expected duration. Pain level reassessed. Patient is     

      alert, oriented x 3, equal unlabored respirations, skin warm/dry/pink. The patient is       

      lying in bed in stable condition. Vitals are stable. The patient denies pain 0/10.          

20:30 Reassessment: The patient was updated on admission status.                              st1 

                                                                                                  

Vital Signs:                                                                                      

13:42  / 81; Pulse 122; Resp 20; Temp 97.9(TE); Pulse Ox 100% ; Weight 83.01 kg; Height jh6 

      5 ft. 8 in. (172.72 cm); Pain 5/10;                                                         

14:00  / 78; Pulse 116; Resp 18; Temp 98.5; Pulse Ox 97% on R/A;                        ke1 

16:00  / 82; Pulse 103; Resp 22; Pulse Ox 99% on R/A;                                   ke1 

17:30  / 101; Pulse 92; Resp 19; Pulse Ox 99% on R/A;                                   ke1 

18:30  / 71; Pulse 103; Resp 16; Pulse Ox 98% on R/A;                                   st1 

20:20  / 77; Pulse 95; Resp 16; Pulse Ox 95% on R/A;                                    st1 

22:21  / 81; Pulse 88; Resp 16; Pulse Ox 99% on R/A;                                    st1 

13:42 Body Mass Index 27.82 (83.01 kg, 172.72 cm)                                             6 

                                                                                                  

Vitals:                                                                                           

14:00 Cardiac Rhythm Assessment Regular Sinus rhythm.                                         ke1 

                                                                                                  

ED Course:                                                                                        

13:33 Patient arrived in ED.                                                                  am2 

13:33 Sharda Babb MD is Private Physician.                                                      am2 

13:44 Triage completed.                                                                       jh6 

13:46 Arm band placed on left wrist.                                                          6 

13:50 Rio Silva, RUDDY is Primary Nurse.                                                 ke1 

13:51 Dread Silva PA is PHCP.                                                              [facility] 

13:51 Leonid Narayanan MD is Attending Physician.                                             [facility] 

13:58 EKG done, by ED staff, reviewed by Dread ROSALES.                                     mb7 

14:15 Missed attempt(s): 20 gauge in left forearm.                                            ke1 

14:18 XRAY Chest (1 view) In Process Unspecified.                                             ED[facility]

14:22 Missed attempt(s): 22 gauge in left forearm.                                            ke1 

14:45 Missed attempt(s): 22 gauge in left forearm. Bleeding controlled, band aid applied,     jd3 

      catheter tip intact.                                                                        

14:49 Inserted saline lock: 22 gauge in left antecubital area, using aseptic technique. Blood jd3 

      collected.                                                                                  

15:01 CT Chest For PE Angio In Process Unspecified.                                           EDMS

16:37 US Abdomen Limited In Process Unspecified.                                              EDMS

16:45 Juan Walden MD is Hospitalizing Provider.                                          [facility] 

19:00 Patient has correct armband on for positive identification. Bed in low position. Call   st1 

      light in reach. Side rails up X 1. Pulse ox on. NIBP on.                                    

22:01 No provider procedures requiring assistance completed. Patient maintains SpO2           st1 

      saturation greater than 95% on room air.                                                    

22:18 Patient admitted, IV remains in place.                                                  st1 

                                                                                                  

Administered Medications:                                                                         

14:54 Drug: morphine 2 mg Route: IVP; Site: left antecubital;                                 ke1 

15:15 Follow up: Response: Pain is decreased                                                  ke1 

14:54 Drug: Zofran (Ondansetron) 4 mg Route: IVP; Site: left antecubital;                     ke1 

15:15 Follow up: Response: No adverse reaction                                                ke1 

16:06 Drug: morphine 4 mg Route: IVP; Site: left antecubital;                                 ke1 

17:33 Follow up: Response: Pain is decreased; RASS: Alert and Calm (0)                        ke1 

17:29 Drug: Aspirin Chewable Tablet 324 mg Route: PO;                                         ke1 

                                                                                                  

                                                                                                  

Outcome:                                                                                          

16:45 Decision to Hospitalize by Provider.                                                    [facility] 

22:18 Condition: good                                                                         st1 

22:18 Condition: improved                                                                         

22:18 Instructed on the need for admit.                                                           

23:00 Admitted to Med/surg accompanied by tech, via stretcher, with chart.                    st1 

23:01 Patient left the ED.                                                                    mw2 

                                                                                                  

Signatures:                                                                                       

Dispatcher [employer]                           EDMS                                                 

Dread Silva PA PA   Suzette Huff                               am2                                                  

Rony Black RN                    RN   Angela Pemberton                            mw2                                                  

Zoila Sanz RN                   RN   jh6                                                  

[place]                               mb7                                                  

Dulce Arciniega, RN                     RN   st1                                                  

Rio Silva RN                   RN   ke1                                                  

                                                                                                  

Corrections: (The following items were deleted from the chart)                                    

14:05 14:01 Pain: Complains of pain in chest Pain currently is 5 out of 10 on a pain scale.   ke1 

      at worst was 9 out of 10 on a pain scale. level that patient reports is acceptable is 5     

      out of 10 on a pain scale. Alleviated by rest, Aggravated by exercise, repositioning,       

      ke1                                                                                         

14:50 14:49 Inserted saline lock: 22 gauge in left antecubital area, using aseptic technique. jd3 

      Blood collected. Missed attempt(s): 22 gauge in left forearm. Bleeding controlled, band     

      aid applied, catheter tip intact. jd3                                                       

17:33 16:15 Response: Pain is decreased ke1                                                   ke1 

03/01                                                                                             

04:33 02/28 22:15 Admitted to st1                                                             st1 

                                                                                                  

**************************************************************************************************

## 2022-02-28 NOTE — RAD REPORT
EXAM DESCRIPTION:  US - Abdomen Exam Limited - 2/28/2022 4:37 pm

 

CLINICAL HISTORY:  Abdominal pain.

 

COMPARISON:  None.

 

FINDINGS:   The gallbladder wall is not thickened. A gallstone is not seen.

 

The biliary tree is normal caliber.

 

IMPRESSION:  Unremarkable gallbladder ultrasound.

## 2022-02-28 NOTE — RAD REPORT
EXAM DESCRIPTION:  Tera Single View2/28/2022 2:18 pm

 

CLINICAL HISTORY:  Chest pain

 

COMPARISON:  2015

 

FINDINGS:  A small right pleural effusion with mild right basilar atelectasis

 

A few areas of subsegmental atelectasis left lung base

 

Heart is normal size

## 2022-02-28 NOTE — RAD REPORT
EXAM DESCRIPTION:  CT - Chest For Pe Angio - 2/28/2022 3:02 pm

 

CLINICAL HISTORY:   Chest pain

 

COMPARISON:  None.

 

TECHNIQUE:  Dynamically enhanced axial 3 mm thick images of the chest were obtained during administra
tion of <100> mL Isovue 370 IV contrast. Coronal and oblique reconstruction images were generated and
 reviewed. Exam utilizes a protocol for optimal evaluation of pulmonary arterial tree.

 

Maximum intensity projections 3D imaging was utilized

 

All CT scans are performed using dose optimization technique as appropriate and may include automated
 exposure control or mA/KV adjustment according to patient size.

 

FINDINGS:  A pulmonary embolus is not seen.

 

A thoracic aortic aneurysm is not noted.

 

Small right pleural effusion. Mild right lower lobe atelectasis.

 

A pericardial effusion is not seen.

 

Paraseptal emphysema. The largest bleb measures 7 centimeters within left lung.

 

IMPRESSION:  Negative for a pulmonary embolism.

## 2022-03-01 VITALS — TEMPERATURE: 98.4 F | DIASTOLIC BLOOD PRESSURE: 76 MMHG | SYSTOLIC BLOOD PRESSURE: 129 MMHG

## 2022-03-01 VITALS — OXYGEN SATURATION: 97 %

## 2022-03-01 LAB
ALBUMIN SERPL BCP-MCNC: 3.2 G/DL (ref 3.4–5)
ALP SERPL-CCNC: 87 U/L (ref 45–117)
ALT SERPL W P-5'-P-CCNC: 19 U/L (ref 12–78)
AST SERPL W P-5'-P-CCNC: 10 U/L (ref 15–37)
BUN BLD-MCNC: 17 MG/DL (ref 7–18)
CKMB CREATINE KINASE MB: 1.1 NG/ML (ref 1–3.6)
CKMB CREATINE KINASE MB: 1.2 NG/ML (ref 1–3.6)
GLUCOSE SERPLBLD-MCNC: 127 MG/DL (ref 74–106)
HCT VFR BLD CALC: 41.4 % (ref 39.6–49)
LYMPHOCYTES # SPEC AUTO: 7.2 K/UL (ref 0.7–4.9)
PMV BLD: 8.8 FL (ref 7.6–11.3)
POTASSIUM SERPL-SCNC: 4 MMOL/L (ref 3.5–5.1)
RBC # BLD: 4.66 M/UL (ref 4.33–5.43)

## 2022-03-01 RX ADMIN — CEFTRIAXONE SCH MLS: 1 INJECTION, POWDER, FOR SOLUTION INTRAMUSCULAR; INTRAVENOUS at 08:01

## 2022-03-01 RX ADMIN — COLCHICINE SCH MG: 0.6 TABLET, FILM COATED ORAL at 08:01

## 2022-03-01 RX ADMIN — HUMAN INSULIN SCH: 100 INJECTION, SOLUTION SUBCUTANEOUS at 07:30

## 2022-03-01 RX ADMIN — HUMAN INSULIN SCH UNIT: 100 INJECTION, SOLUTION SUBCUTANEOUS at 12:46

## 2022-03-01 RX ADMIN — ENOXAPARIN SODIUM SCH MG: 40 INJECTION SUBCUTANEOUS at 08:01

## 2022-03-01 RX ADMIN — Medication SCH ML: at 08:02

## 2022-03-01 NOTE — P.DS
Admission Date: 02/28/22


Discharge Date: 03/01/22


Disposition: ROUTINE DISCHARGE


Discharge Condition: FAIR


Reason for Admission: Substernal chest pain


Brief History of Present Illness: 





71-year-old male with past medical history of hypertension, diabetes mellitus 

type 2, hyperlipidemia, no previous cardiac history presented because of 

substernal chest pain radiating to the right side since the last 3 days.  Pain 

is intermittent, no associated palpitation, no prior history of heart burn.  On 

arrival in the ED, vitals were stable, EKG was unremarkable with normal sinus 

rhythm and no EKG changes.  Chest x-ray shows no acute infiltrate.  CT shows no 

evidence of PE but small right-sided pleural effusion.  Patient admits to 

intermittent lower extremity swelling.  He denies any exertional dyspnea or 

paroxysmal nocturnal dyspnea. patient received 2 doses of COVID vaccine.  proBNP

was normal at 22.  He has been admitted for rule out acute coronary syndrome.


Hospital Course: 





71-year-old male with past medical history of hypertension admitted for right 

chest wall pain pleuritic in nature.  Patient has had 3 doses of Covid vaccine. 

He denies any fever or chills.  On presentation he had mild leukocytosis with 

WBC of 13 K.  A CT scan of the chest with PE protocol shows no evidence of PE 

but small right-sided pleural effusion.  Patient was ruled out with negative 

cardiac enzymes.  Abdominal sonogram shows no gallbladder stone.  Patient was 

managed for empirical pleurisy presumed due to Covid vaccine.  His symptoms is 

somewhat better.  He will be discharged home with oral medication for the next 5

to 7 days.  Given mild leukocytosis although absence of pneumonic infiltrate on 

CT scan as well as chest x-ray but patient was empirically started on 

antibiotics for possible occult pneumonia.








Significant imaging studyEXAM DESCRIPTION:  CT - Chest For Pe Angio - 2/28/2022 

3:02 pm


 


CLINICAL HISTORY:   Chest pain


 


COMPARISON:  None.


 


TECHNIQUE:  Dynamically enhanced axial 3 mm thick images of the chest were 

obtained during administration of <100> mL Isovue 370 IV contrast. Coronal and 

oblique reconstruction images were generated and reviewed. Exam utilizes a 

protocol for optimal evaluation of pulmonary arterial tree.


 


Maximum intensity projections 3D imaging was utilized


 


All CT scans are performed using dose optimization technique as appropriate and 

may include automated exposure control or mA/KV adjustment according to patient 

size.


 


FINDINGS:  A pulmonary embolus is not seen.


 


A thoracic aortic aneurysm is not noted.


 


Small right pleural effusion. Mild right lower lobe atelectasis.


 


A pericardial effusion is not seen.


 


Paraseptal emphysema. The largest bleb measures 7 centimeters within left lung.


 


IMPRESSION:  Negative for a pulmonary embolism


Vital Signs/Physical Exam: 














Temp Pulse Resp BP Pulse Ox


 


 98.4 F   85   18   129/76   98 


 


 03/01/22 12:00  03/01/22 12:00  03/01/22 12:00  03/01/22 12:00  03/01/22 12:00








General: Alert, In no apparent distress, Oriented x3


HEENT: Atraumatic, Normocephalic, PERRLA


Neck: Supple, 2+ carotid pulse no bruit, JVD not distended


Respiratory: Clear to auscultation bilaterally, Normal air movement, Other (mild

right rib margin tenderness)


Cardiovascular: No edema, Normal pulses, Normal S1 S2


Gastrointestinal: Normal bowel sounds, Soft and benign, Non-distended, No 

ascites


Musculoskeletal: No clubbing, No swelling


Integumentary: No rashes, No breakdown, No significant lesion


Neurological: Normal gait, Normal speech, Normal strength at 5/5 x4 extr, Normal

tone, Cranial nerves 3-12 intact


Laboratory Data at Discharge: 














WBC  13.10 K/uL (4.3-10.9)  H  03/01/22  03:34    


 


Hgb  13.4 g/dL (13.6-17.9)  L  03/01/22  03:34    


 


Hct  41.4 % (39.6-49.0)   03/01/22  03:34    


 


Plt Count  304 K/uL (152-406)   03/01/22  03:34    


 


PT  12.7 SECONDS (9.5-12.5)  H  02/28/22  14:45    


 


INR  1.10   02/28/22  14:45    


 


Sodium  138 mmol/L (136-145)   03/01/22  03:34    


 


Potassium  4.0 mmol/L (3.5-5.1)   03/01/22  03:34    


 


BUN  17 mg/dL (7-18)   03/01/22  03:34    


 


Creatinine  0.89 mg/dL (0.55-1.3)   03/01/22  03:34    


 


Glucose  127 mg/dL ()  H  03/01/22  03:34    


 


Magnesium  2.4 mg/dL (1.8-2.4)   02/28/22  14:45    


 


Total Bilirubin  0.3 mg/dL (0.2-1.0)   03/01/22  03:34    


 


AST  10 U/L (15-37)  L  03/01/22  03:34    


 


ALT  19 U/L (12-78)   03/01/22  03:34    


 


Alkaline Phosphatase  87 U/L ()   03/01/22  03:34    








Home Medications: 








Aspirin [Aspirin EC 81 MG] 81 mg PO DAILY 02/28/22 


Atorvastatin Calcium [Lipitor*] 10 mg PO BEDTIME 02/28/22 


Cholecalciferol (Vitamin D3) [Vitamin D3] 3,000 unit PO DAILY 02/28/22 


Enalapril Maleate 20 mg PO BID 02/28/22 


Glimepiride 4 mg PO BID 02/28/22 


Metformin HCl 1,000 mg PO DAILY 02/28/22 


Pioglitazone [Actos*] 30 mg PO DAILY 02/28/22 


Propranolol [Inderal*] 20 mg PO DAILY 02/28/22 


Amox/Clavulanate [Augmentin 875-125 Tab] 875 mg PO BID #10 tab 03/01/22 


Colchicine [Colcrys *] 0.6 mg PO BID #10 tab 03/01/22 


Guaifenesin/Dextromethorphan [Guaifenesin-Dm ER 1,200-60 mg] 1 each PO BID #10 

tab.er.12h 03/01/22 


Naproxen/Esomeprazole Mag [Naproxen-Esomepraz Dr 500-20Mg] 1 each PO BID 10 Days

#20 tab.ir. 03/01/22 





New Medications: 


Amox/Clavulanate [Augmentin 875-125 Tab] 875 mg PO BID #10 tab


Colchicine [Colcrys *] 0.6 mg PO BID #10 tab


Guaifenesin/Dextromethorphan [Guaifenesin-Dm ER 1,200-60 mg] 1 each PO BID #10 

tab.er.12h


Naproxen/Esomeprazole Mag [Naproxen-Esomepraz Dr 500-20Mg] 1 each PO BID 10 Days

#20 tab.ir.


Diet: ADA


Activity: Ad torie


Followup: 


Sharda Babb, DO [Primary Care Provider] - 1 Week


Time spent managing pt's care (in minutes): 35

## 2022-03-01 NOTE — EKG
Test Date:    2022-02-28               Test Time:    13:57:24

Technician:   SAURABH                                    

                                                     

MEASUREMENT RESULTS:                                       

Intervals:                                           

Rate:         111                                    

NM:           164                                    

QRSD:         82                                     

QT:           316                                    

QTc:          429                                    

Axis:                                                

P:            70                                     

NM:           164                                    

QRS:          75                                     

T:            48                                     

                                                     

INTERPRETIVE STATEMENTS:                                       

                                                     

Sinus tachycardia

Otherwise normal ECG

No previous ECG available for comparison



Electronically Signed On 03-01-22 09:09:17 CST by Constantine Hall

## 2022-12-30 ENCOUNTER — HOSPITAL ENCOUNTER (EMERGENCY)
Dept: HOSPITAL 97 - ER | Age: 72
Discharge: HOME | End: 2022-12-30
Payer: MEDICARE

## 2022-12-30 VITALS — TEMPERATURE: 98 F | DIASTOLIC BLOOD PRESSURE: 86 MMHG | SYSTOLIC BLOOD PRESSURE: 137 MMHG | OXYGEN SATURATION: 100 %

## 2022-12-30 DIAGNOSIS — D72.829: ICD-10-CM

## 2022-12-30 DIAGNOSIS — R10.84: Primary | ICD-10-CM

## 2022-12-30 LAB
ALBUMIN SERPL BCP-MCNC: 3.8 G/DL (ref 3.4–5)
ALP SERPL-CCNC: 94 U/L (ref 45–117)
ALT SERPL W P-5'-P-CCNC: 30 U/L (ref 16–61)
ANISOCYTOSIS BLD QL: (no result)
AST SERPL W P-5'-P-CCNC: 17 U/L (ref 15–37)
BUN BLD-MCNC: 20 MG/DL (ref 7–18)
GLUCOSE SERPLBLD-MCNC: 123 MG/DL (ref 74–106)
HCT VFR BLD CALC: 42.8 % (ref 39.6–49)
LIPASE SERPL-CCNC: 180 U/L (ref 73–393)
LYMPHOCYTES # SPEC AUTO: 11 K/UL (ref 0.7–4.9)
MCV RBC: 87.4 FL (ref 80–100)
MORPHOLOGY BLD-IMP: (no result)
PMV BLD: 8.4 FL (ref 7.6–11.3)
POTASSIUM SERPL-SCNC: 4.4 MMOL/L (ref 3.5–5.1)
RBC # BLD: 4.9 M/UL (ref 4.33–5.43)

## 2022-12-30 PROCEDURE — 99284 EMERGENCY DEPT VISIT MOD MDM: CPT

## 2022-12-30 PROCEDURE — 85025 COMPLETE CBC W/AUTO DIFF WBC: CPT

## 2022-12-30 PROCEDURE — 74177 CT ABD & PELVIS W/CONTRAST: CPT

## 2022-12-30 PROCEDURE — 36415 COLL VENOUS BLD VENIPUNCTURE: CPT

## 2022-12-30 PROCEDURE — 80053 COMPREHEN METABOLIC PANEL: CPT

## 2022-12-30 PROCEDURE — 83690 ASSAY OF LIPASE: CPT

## 2022-12-30 PROCEDURE — 82565 ASSAY OF CREATININE: CPT

## 2022-12-30 NOTE — ER
Nurse's Notes                                                                                     

 [facility]                                                                 

Name: Jozef Langley                                                                                

Age: 72 yrs                                                                                       

Sex: Male                                                                                         

: 1950                                                                                   

MRN: [id number]                                                                                   

Arrival Date: 2022                                                                          

Time: 09:39                                                                                       

Account#: [id number]                                                                            

Bed 8                                                                                             

Private MD: Sharda Babb                                                                              

Diagnosis: Abdominal pain, Generalized;Elevated white blood cell count                            

                                                                                                  

Presentation:                                                                                     

                                                                                             

10:03 Chief complaint: Patient states: abd pain X 2 weeks, n/v last night. Coronavirus        iw  

      screen: At this time, the client does not indicate any symptoms associated with             

      coronavirus-19. Ebola Screen: Patient negative for fever greater than or equal to 101.5     

      degrees Fahrenheit, and additional compatible Ebola Virus Disease symptoms Patient          

      denies exposure to infectious person. Patient denies travel to an Ebola-affected area       

      in the 21 days before illness onset. No symptoms or risks identified at this time.          

      Initial Sepsis Screen: Does the patient meet any 2 criteria? No. Patient's initial          

      sepsis screen is negative. Does the patient have a suspected source of infection? No.       

      Patient's initial sepsis screen is negative. Risk Assessment: Do you want to hurt           

      yourself or someone else? Patient reports no desire to harm self or others. Onset of        

      symptoms was December 15, 2022.                                                             

10:03 Acuity: NIMO 3                                                                           iw  

11:26 Method Of Arrival: Ambulatory                                                           ph  

                                                                                                  

Historical:                                                                                       

- Allergies:                                                                                      

10:04 No Known Allergies;                                                                     iw  

- PMHx:                                                                                           

10:04 diabetes mellitus; Hypertensive disorder; Hypercholesterolemia;                         iw  

- PSHx:                                                                                           

10:04 Appendectomy;                                                                           iw  

                                                                                                  

- Immunization history:: Client reports receiving the 2nd dose of the Covid vaccine.              

- Social history:: Smoking status: Patient denies any tobacco usage or history of.                

                                                                                                  

                                                                                                  

Screening:                                                                                        

10:29 [facility] ED Fall Risk Assessment (Adult) History of falling in the last 3 months,       ph  

      including since admission No falls in past 3 months (0 pts). Abuse screen: Denies           

      threats or abuse. Denies injuries from another. Nutritional screening: No deficits          

      noted. Tuberculosis screening: No symptoms or risk factors identified.                      

                                                                                                  

Assessment:                                                                                       

10:46 Reassessment: Patient appears in no apparent distress at this time. spouse is at        db  

      bedside.                                                                                    

11:01 Reassessment: Patient appears in no apparent distress at this time. Patient and/or      db  

      family updated on plan of care and expected duration. Pain level reassessed. Patient is     

      alert, oriented x 3, equal unlabored respirations, skin warm/dry/pink. General: Appears     

      in no apparent distress. comfortable, Behavior is calm, cooperative, appropriate for        

      age, quiet. Pain: Complains of pain in abdomen. Pain:. Neuro: No deficits noted. Level      

      of Consciousness is awake, alert, obeys commands, Oriented to person, place, time,          

      situation, Appropriate for age. Cardiovascular: No deficits noted. Respiratory: No          

      deficits noted. Airway is patent. GI: No deficits noted. Bowel sounds present X 4           

      quads. Abd is soft Abdomen is tender to palpation X 4 quads. patient states abdomen is      

      "sore". : No deficits noted. No signs and/or symptoms were reported regarding the         

      genitourinary system.                                                                       

11:26 Reassessment: Patient appears in no apparent distress at this time. Patient and/or      ph  

      family updated on plan of care and expected duration. Pain level reassessed. Patient is     

      alert, oriented x 3, equal unlabored respirations, skin warm/dry/pink.                      

13:00 Reassessment: Patient appears in no apparent distress at this time. Patient and/or      db  

      family updated on plan of care and expected duration. Pain level reassessed. Patient is     

      alert, oriented x 3, equal unlabored respirations, skin warm/dry/pink.                      

14:24 Reassessment: Patient appears in no apparent distress at this time. Patient and/or      db  

      family updated on plan of care and expected duration. Pain level reassessed. Patient is     

      alert, oriented x 3, equal unlabored respirations, skin warm/dry/pink. Patient states       

      feeling better. Patient states symptoms have improved.                                      

                                                                                                  

Vital Signs:                                                                                      

10:03  / 93; Pulse 79; Resp 18; Temp 98.0; Pulse Ox 100% on R/A; Weight 81.65 kg;       iw  

      Height 5 ft. 9 in. (175.26 cm); Pain 5/10;                                                  

10:55  / 89; Pulse 74; Resp 16; Pulse Ox 95% on R/A;                                    db  

11:26  / 86; Pulse 74; Resp 18; Pulse Ox 97% on R/A;                                    ph  

14:24  / 86; Pulse 70; Resp 18; Temp 98; Pulse Ox 100% ;                                db  

10:03 Body Mass Index 26.58 (81.65 kg, 175.26 cm)                                             iw  

                                                                                                  

ED Course:                                                                                        

09:39 Patient arrived in ED.                                                                  rg4 

09:39 Sharda Babb MD is Private Physician.                                                      rg4 

09:45 [facility]                                                        kb  

09:45 Al Hoffman MD is Attending Physician.                                                kb  

10:04 Triage completed.                                                                       iw  

10:04 Arm band placed on.                                                                     iw  

10:28 Danielle Brown, RN is Primary Nurse.                                                    ph  

10:29 Patient has correct armband on for positive identification. Bed in low position. Call   ph  

      light in reach. Side rails up X 1. Pulse ox on. NIBP on.                                    

10:38 CBC with Diff Sent.                                                                     bc6 

10:38 CMP Sent.                                                                               bc6 

10:38 Lipase Sent.                                                                            bc6 

10:38 Inserted saline lock: 20 gauge in right antecubital area, using aseptic technique.      bc6 

10:39 Initial lab(s) drawn, by me.                                                            bc6 

10:47 Abdomen In Process Unspecified.                                                         EDMS

14:24 No provider procedures requiring assistance completed. IV discontinued, intact,         db  

      bleeding controlled, No redness/swelling at site.                                           

                                                                                                  

Administered Medications:                                                                         

No medications were administered                                                                  

                                                                                                  

                                                                                                  

Medication:                                                                                       

10:30 VIS not applicable for this client.                                                     ph  

                                                                                                  

Outcome:                                                                                          

13:29 Discharge ordered by MD.                                                                kb  

14:24 Discharged to home ambulatory.                                                          db  

14:24 Condition: stable                                                                           

14:24 Discharge instructions given to patient, family, Instructed on discharge instructions,      

      Demonstrated understanding of instructions.                                                 

14:25 Patient left the ED.                                                                    db  

                                                                                                  

Signatures:                                                                                       

Dispatcher [employer]                           EDMS                                                 

Sharonda Shell FNP-C FNP-An Salguero RN RN                                                      

Danielle Brown, RN                      RN   ph                                                   

Edilma Benavides                                 rg4                                                  

Lise Love RN                    RN   db                                                   

Xiomara Roque                           bc6                                                  

                                                                                                  

**************************************************************************************************

## 2022-12-30 NOTE — RAD REPORT
EXAM DESCRIPTION:  CT - Abdomen   Pelvis W Contrast - 12/30/2022 10:46 am

 

CLINICAL HISTORY:  abd pain

 

COMPARISON:  No comparisons

 

TECHNIQUE:  Biphasic, helical CT imaging of the abdomen and pelvis was performed following 100 ml non
-ionic IV contrast.

 

Oral contrast: No.

 

All CT scans are performed using dose optimization technique as appropriate and may include automated
 exposure control or mA/KV adjustment according to patient size.

 

FINDINGS:  No suspicious findings in the lung bases.

 

The liver, spleen, and pancreas show no suspicious findings. Gallbladder and biliary tree are also wi
thout suspicious finding. 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

 

Symmetric renal function is seen with no hydronephrosis or suspicious renal mass. No pyelonephritis o
r acute parenchymal process. Bilateral renal cysts are present more numerous and larger on the left. 
Largest left cyst is 4.8 cm. No adrenal abnormalities. Partially filled urinary bladder shows no susp
icious finding.

 

No stomach or small bowel abnormality seen. No evidence for appendicitis. Moderate stool volume is se
en throughout the colon with no acute colon process identifiable. Circumferential narrowing of the re
ctum is believed to be peristalsis artifact. The perirectal fat shows no stranding, edema or lymphade
nopathy. The patient does have a few small lymph nodes in the central mesentery.  No free air, free f
luid or inflammatory stranding.  No hernia, mass or bulky lymphadenopathy. 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
. There is asymmetry in the density of the implant and there may be absent or diminished functionalit
y. No emergent component.

 

Disc and bone degenerative changes are present most pronounced at the L5-S1 level.

 

 

IMPRESSION:  Contrast enhanced CT abdomen and pelvis showing no emergent finding.

 

A few small nonspecific mesenteric lymph nodes present.

## 2022-12-30 NOTE — EDPHYS
Physician Documentation                                                                           

 [facility]                                                                 

Name: Jozef Langley                                                                                

Age: 72 yrs                                                                                       

Sex: Male                                                                                         

: 1950                                                                                   

MRN: [id number]                                                                                   

Arrival Date: 2022                                                                          

Time: 09:39                                                                                       

Account#: [id number]                                                                            

Bed 8                                                                                             

Private MD: Sharda Babb                                                                              

ED Physician Al Hoffman                                                                         

HPI:                                                                                              

                                                                                             

13:53 This 72 yrs old Black Male presents to ER via Ambulatory with complaints of Abdominal   kb  

      Pain.                                                                                       

13:53 The patient presents with abdominal pain that is diffuse. Onset: The symptoms/episode   kb  

      began/occurred 2 week(s) ago. The symptoms do not radiate. Associated signs and             

      symptoms: Pertinent positives: nausea and vomiting, constipation. The symptoms are          

      described as constant. Modifying factors: The symptoms are alleviated by nothing, the       

      symptoms are aggravated by nothing. Severity of pain: At its worst the pain was mild in     

      the emergency department the pain is unchanged. The patient has not experienced similar     

      symptoms in the past. The patient has not recently seen a physician. Pt reports             

      abdominal fullness for 2 weeks. states he has been having small bowel movements, but        

      feels like he isn't getting everything out. Drank some milk of magnesia last night,         

      then a sprite and had an episode of vomiting. .                                             

                                                                                                  

Historical:                                                                                       

- Allergies:                                                                                      

10:04 No Known Allergies;                                                                     iw  

- PMHx:                                                                                           

10:04 diabetes mellitus; Hypertensive disorder; Hypercholesterolemia;                         iw  

- PSHx:                                                                                           

10:04 Appendectomy;                                                                           iw  

                                                                                                  

- Immunization history:: Client reports receiving the 2nd dose of the Covid vaccine.              

- Social history:: Smoking status: Patient denies any tobacco usage or history of.                

                                                                                                  

                                                                                                  

ROS:                                                                                              

13:53 Constitutional: Negative for fever, chills, and weight loss.                            kb  

13:53 Abdomen/GI: Positive for abdominal pain, nausea and vomiting, constipation.                 

13:53 All other systems are negative.                                                             

                                                                                                  

Exam:                                                                                             

13:53 Constitutional:  This is a well developed, well nourished patient who is awake, alert,  kb  

      and in no acute distress. Head/Face:  Normocephalic, atraumatic. ENT:  Moist Mucous         

      membranes Cardiovascular:  Regular rate and rhythm with a normal S1 and S2.  No             

      gallops, murmurs, or rubs.  No pulse deficits. Respiratory:  Respirations even and          

      unlabored. No increased work of breathing. Talking in full sentences Skin:  Warm, dry       

      with normal turgor.  Normal color. MS/ Extremity:  Pulses equal, no cyanosis.               

      Neurovascular intact.  Full, normal range of motion. Neuro:  Awake and alert, GCS 15,       

      oriented to person, place, time, and situation. Moves all extremities. Normal gait.         

      Psych:  Awake, alert, with orientation to person, place and time.  Behavior, mood, and      

      affect are within normal limits.                                                            

13:53 Abdomen/GI: Inspection: abdomen appears normal, Bowel sounds: normal, in all quadrants,     

      Palpation: soft, in all quadrants, mild abdominal tenderness, in all quadrants.             

                                                                                                  

Vital Signs:                                                                                      

10:03  / 93; Pulse 79; Resp 18; Temp 98.0; Pulse Ox 100% on R/A; Weight 81.65 kg;       iw  

      Height 5 ft. 9 in. (175.26 cm); Pain 5/10;                                                  

10:55  / 89; Pulse 74; Resp 16; Pulse Ox 95% on R/A;                                    db  

11:26  / 86; Pulse 74; Resp 18; Pulse Ox 97% on R/A;                                    ph  

14:24  / 86; Pulse 70; Resp 18; Temp 98; Pulse Ox 100% ;                                db  

10:03 Body Mass Index 26.58 (81.65 kg, 175.26 cm)                                             iw  

                                                                                                  

MDM:                                                                                              

10:03 Patient medically screened.                                                             kb  

13:56 Data reviewed: vital signs, nurses notes. Data interpreted: Pulse oximetry: on room air kb  

      is 97 %. Interpretation: normal. Counseling: I had a detailed discussion with the           

      patient and/or guardian regarding: the historical points, exam findings, and any            

      diagnostic results supporting the discharge/admit diagnosis, lab results, radiology         

      results, the need for outpatient follow up, a family practitioner, a                        

      gastroenterologist, to return to the emergency department if symptoms worsen or persist     

      or if there are any questions or concerns that arise at home.                               

                                                                                                  

                                                                                             

09:56 Order name: CBC with Diff; Complete Time: 12:35                                         iw  

                                                                                             

09:56 Order name: CMP; Complete Time: 11:17                                                   iw  

                                                                                             

09:56 Order name: Lipase; Complete Time: 11:17                                                iw  

                                                                                             

09:56 Order name: CT Abd/Pelvis - IV Contrast Only                                            iw  

                                                                                             

10:58 Order name: Manual Differential; Complete Time: 12:35                                   EDMS

                                                                                             

12:05 Order name: CREATININE WHOLE BLOOD; Complete Time: 12:09                                EDMS

                                                                                             

09:56 Order name: IV Saline Lock; Complete Time: 10:38                                        iw  

                                                                                             

09:56 Order name: Labs collected and sent; Complete Time: 10:                               iw  

                                                                                             

10:00 Order name: Abdomen ; Complete Time: 11:17                                              EDMS

                                                                                                  

Administered Medications:                                                                         

No medications were administered                                                                  

                                                                                                  

                                                                                                  

Disposition:                                                                                      

15:18 Co-signature as Attending Physician, Al Hoffman MD.                                    rn  

                                                                                                  

Disposition Summary:                                                                              

22 13:29                                                                                    

Discharge Ordered                                                                                 

      Location: Home                                                                          kb  

      Condition: Stable                                                                       kb  

      Diagnosis                                                                                   

        - Abdominal pain, Generalized                                                         kb  

        - Elevated white blood cell count                                                     kb  

      Followup:                                                                               kb  

        - With: Emergency Department                                                               

        - When: As needed                                                                          

        - Reason: Worsening of condition                                                           

      Followup:                                                                               kb  

        - With: Private Physician                                                                  

        - When: 2 - 3 days                                                                         

        - Reason: Recheck today's complaints, Continuance of care, Re-evaluation by your           

      physician                                                                                   

      Discharge Instructions:                                                                     

        - Discharge Summary Sheet                                                             kb  

        - Abdominal Pain, Adult, Easy-to-Read                                                 kb  

        - Leukocytosis                                                                        kb  

      Forms:                                                                                      

        - Medication Reconciliation Form                                                      kb  

        - Thank You Letter                                                                    kb  

        - Antibiotic Education                                                                kb  

        - Prescription Opioid Use                                                             kb  

Signatures:                                                                                       

Dispatcher [employer]                           EDMS                                                 

Sharonda Shell, SARAHI-C                 SARAHI-An Salguero, RN                     RN   iw                                                   

Al Hoffman MD MD   rn                                                   

                                                                                                  

**************************************************************************************************

## 2022-12-30 NOTE — XMS REPORT
Continuity of Care Document

                          Created on:2022



Patient:GERALD LANGLEY

Sex:Male

:1950

External Reference #:[id number]





Demographics







                          Address                   [address]



                                                    [place] [address]

 

                          Home Phone                [phone] CELL

 

                          Work Phone                [phone]

 

                          Mobile Phone              [phone]

 

                          Email Address             NONE

 

                          Preferred Language        English

 

                          Marital Status            Unknown

 

                          Moravian Affiliation     Unknown

 

                          Race                      Unknown

 

                          Additional Race(s)        Unavailable



                                                    Black or 



                                                    Unavailable

 

                          Ethnic Group              Unknown









Author







                          Organization              [facility]

 

                          Address                   [address]



                                                    [place] [address]

 

                          Phone                     [phone]









Support







                Name            Relationship    Address         Phone

 

                GERARDO LANGLEY Spouse          [address] [phone]



                                                [place] [address] 

 

                [employer]   Unavailable     [phone]      [phone]



                                                [place] [address] 

 

                Gerald Langley   Unavailable     [address] [phone]

4



                                                [place] [address] 

 

                ALCIDES LANGLEY Unavailable     [address] [phone]

9



                                                [place] [address] 









Care Team Providers







                    Name                Role                Phone

 

                    Sharda Babb         Primary Care Physician [phone]

 

                    Sharda Babb          Attending Clinician Unavailable

 

                    SYED CHOUDHARY      Attending Clinician Unavailable

 

                    Jeronimo Vasquez        Attending Clinician [phone]

 

                    Miya            Attending Clinician Unavailable

 

                    Sonja_MARY         Attending Clinician Unavailable

 

                    RENATO BUTLER     Attending Clinician Unavailable

 

                    Esdras Obando   Attending Clinician Unavailable

 

                    Nelson Salcedo MD Attending Clinician [phone]

 

                    Doctor Unassigned, No Name Attending Clinician Unavailable

 

                    Thomas_MARY            Admitting Clinician Unavailable

 

                    Delkenya_T         Admitting Clinician Unavailable









Payers







           Payer Name Policy Type Policy Number Effective Date Expiration Date S

devi

 

           [employer] MEDICARE            [id number]  2020            



           ADVANTAGE HMO                       00:00:00              

 

           [facility]            [id number]     2022            



           (MEDICARE                        00:00:00              



           REPLACEMENT HMO)                                             

 

           [employer] MEDICARE 53         [id number]  2020            [facility]



                                            00:00:00              [facility]



                                                                  [facility]

 

           MEDICARE NOVITAS MB         [id number]                       Common



                                                                  [facility]



                                                                  [facility]

 

           MEDICARE NOVITAS MB         [id number]                       Common



                                                                  [facility]

 

           MEDICARE NOVITAS MB         [id number]                       Common



                                                                  [facility]

 

           [employer] MEDICARE C1         [id number]  2020            Common



                                            00:00:00              [facility]

 

           [employer] MEDICARE C1         [id number]  2020            Common



                                            00:00:00              [facility]

 

           [employer] MEDICARE C1         [id number]  2020            Common



                                            00:00:00              [facility]

 

           HUMAN MEDICARE C1         [id number]  2020            Common



                                            00:00:00              [facility]

 

           WELLCARE ИРИНА            [id number]  2019            



           PLUS                             00:00:00              



           CLASSIC/VALUE                                             







Problems







       Condition Condition Condition Status Onset  Resolution Last   Treating Co

mments 

Source



       Name   Details Category        Date   Date   Treatment Clinician        



                                                 Date                 

 

       Chronic Chronic Disease Active 2021                             [facility]



       venous venous                                              Health



       insufficie insufficie               00:00:                             



       ncy    ncy                  00                                 

 

       Varicose Varicose Disease Active 2021                             [facility]



       veins of veins of               0-27                               Health



       both lower both lower               00:00:                             



       extremitie extremitie               00                                 



       s with s with                                                  



       inflammati inflammati                                                  



       on     on                                                      

 

       No known No known Disease                                           Unive

rs



       active active                                                  ity of



       problems problems                                                  [facility]

 

       [id number] Other  Problem                                           Common



              chronic                                                  [facility]



              pain                                                    [facility]

 

       [id number] Uncontroll Problem                                           Co

mmon



              ed type 2                                                  [facility]



              diabetes                                                  - [facility]



              mellitus                                                  [facility]



              complicati                                                  Medica

l



              on,                                                     Center



              without                                                  



              long-term                                                  



              current                                                  



              use of                                                  



              insulin                                                  

 

       Diabetes Diabetes Problem                                           Commo

n



       mellitus                                                         [facility]



       without                                                         - [facility]



       complicati                                                         [facility]

 

       Essential Benign Problem                                           Common



       hypertensi essential                                                  Spi

rit



       on     HTN                                                     [facility]

 

       [id number] Keloid of Problem                                           Comm

on



              skin                                                    [facility]

 

       Pure   Pure   Problem                                           Common



       hyperchole hyperchole                                                  Sp

paula



       sterolemia sterolemia                                                  [facility]

 

       [id number] Tremor Problem                                           Common



                                                                      [facility]

 

       Hypercalce Hypercalce Problem                                           C

[facility]

 

       Asbestosis Asbestosis Problem                                           C

[facility]

 

       Impotence Erectile Problem                                           Comm

on



       of organic dysfunctio                                                  Sp

paula



       origin n,                                                      - [facility]



              unspecifie                                                  St



              [facility]



              dysfunctio                                                  Medica

l



              n type                                                  Center

 

       Osteoarthr Osteoarthr Problem                                           C

ommon



       itis   itis                                                    [facility]

 

       [id number] Anemia, Problem                                           Commo

n



              unspecifie                                                  Spirit



              d type                                                  - [facility]

 

       [id number] Elevated Problem                                           Commo

n



              lymphocyte                                                  Spirit



              s                                                       - [facility]

 

       Iron   Iron   Problem                                           Common



       deficiency deficiency                                                  Sp

paula



       anemia due anemia due                                                  - 

[facility]



       to dietary to dietary                                                  St



       [facility]

 

       Iron   Iron   Problem                                           Common



       deficiency deficiency                                                  Sp

paula



       anemia anemia,                                                  - [facility]



              unspecifie                                                  St



              d iron                                                  [facility]



              deficiency                                                  Medica

l



              anemia                                                  Center



              type                                                    







Allergies, Adverse Reactions, Alerts







       Allergy Allergy Status Severity Reaction(s) Onset  Inactive Treating Comm

ents 

Source



       Name   Type                        Date   Date   Clinician        

 

       No Known DA     Active U             2020                      [facility]



       Drug                               2                        



       Allergie                             00:00:                      



       s                                  00                          

 

       No Known DA     Active U             2020                      [facility]



       Drug                                                       



       Allergie                             00:00:                      



       s                                  00                          

 

       NO KNOWN Drug   Active                                           [facility]



       ALLERGIE Class                                                   ity of



       S                                                              [facility]







Social History







           Social Habit Start Date Stop Date  Quantity   Comments   Source

 

           Alcohol intake                                             [facility]

 

           [facility] of                                             [facility] 

-



           Tobacco Use                                             [facility]

 

           Sex Assigned At                                             [facility]

paula -



           Birth                                                  [facility]

 

           Tobacco use and 2021-10-19 2021-10-19 Smokeless tobacco            [facility]

 [facility]



           exposure   00:00:00   00:00:00   non-user              

 

           Alcohol Comment 2019 socially              [facility]

y of



                      00:00:00   00:00:00                         [facility]









                Smoking Status  Start Date      Stop Date       Source

 

                Former Smoker   2022 00:00:00 2022 00:00:00 [facility]

[facility]irit [facility]

 

                Never Smoker                                    [facility]







Medications







       Ordered Filled Start  Stop   Current Ordering Indication Dosage Frequency

 Signature

                    Comments            Components          Source



     Medication Medication Date Date Medication? Clinician                (SIG) 

          



     Name Name                                                   

 

     enalapril      2021      Yes                 Q12H every 12           [facility]



     (Vasotec)      0-19                               (twelve)           Health



     20 MG      15:45:                               hours.           



     tablet      18                                                

 

     metFORMIN      2021      Yes                 Q12H every 12           [facility]



     (Glucophage      0-19                               (twelve)           Heal

th



     ) 1000 MG      15:45:                               hours.           



     tablet      18                                                

 

     propranolol      2021      Yes                 Q12H every 12           [facility]



     (Inderal)      0-19                               (twelve)           Health



     20 MG      15:45:                               hours.           



     tablet      18                                                

 

     tamsulosin      2021      Yes                      1 (one)           [facility]



     (Flomax)      0-19                               time each           Health



     0.4 MG 24      15:45:                               day at the           



     hr capsule      18                                 same time.           

 

     enalapril      2021      Yes                 Q12H every 12           [facility]



     (Vasotec)      0-19                               (twelve)           Health



     20 MG      15:45:                               hours.           



     tablet      18                                                

 

     metFORMIN      2021      Yes                 Q12H every 12           [facility]



     (Glucophage      0-19                               (twelve)           Heal

th



     ) 1000 MG      15:45:                               hours.           



     tablet      18                                                

 

     propranolol      2021      Yes                 Q12H every 12           [facility]



     (Inderal)      0-19                               (twelve)           Health



     20 MG      15:45:                               hours.           



     tablet      18                                                

 

     tamsulosin      2021      Yes                      1 (one)           [facility]



     (Flomax)      0-19                               time each           Health



     0.4 MG 24      15:45:                               day at the           



     hr capsule      18                                 same time.           

 

     atorvastati      2021      Yes            1{tbl} QD   Take 1           [facility]



     n (Lipitor)      0-19                               tablet by           Hea

lth



     10 MG      15:45:                               mouth 1           



     tablet      17                                 (one) time           



                                                  each day.           

 

     atorvastati      2021      Yes            1{tbl} QD   Take 1           [facility]



     n (Lipitor)      0-19                               tablet by           Hea

lth



     10 MG      15:45:                               mouth 1           



     tablet      17                                 (one) time           



                                                  each day.           

 

     glimepiride            Yes                                     [facility]



     (Amaryl) 4      7-31                                              Health



     MG tablet      00:00:                                              



               00                                                

 

     glimepiride            Yes                                     [facility]



     (Amaryl) 4      7-31                                              Health



     MG tablet      00:00:                                              



               00                                                

 

     multivitami            Yes                      Take by           Uni

vers



     n with                                     mouth.           ity of



     minerals      21:25:                                              [place]



     (ONE-A-DAY      09                                                Medical



     50 PLUS                                                        Branch



     ORAL)                                                        

 

     Cholecalcif            Yes                      Take by           [employer]

vers



     yassine,                                     mouth.           ity of



     Vitamin D3,      21:25:                                              [place]



     3,000 unit      09                                                Medical



     Tab                                                         Branch

 

     vit B            Yes                      Take by           [facility]



     complex                                     mouth.           ity of



     no.12/niaci      21:25:                                              [place]



     n,B3,      09                                                Medical



     (VITAMIN B                                                        Branch



     COMPLEX                                                        



     NO.12-NIACI                                                        



     N ORAL)                                                        

 

     omega-3            Yes                      Take by           [employer]



     fatty                                     mouth.           ity of



     acids/dha/e      21:25:                                              [place]



     pa (MEGARED      09                                                Medical



     PLANT-OMEGA                                                        Branch



     -3 ORAL)                                                        

 

     multivitami      2019-0      Yes                      Take by           Uni

vers



     n with      7-                               mouth.           ity of



     minerals      21:25:                                              [place]



     (ONE-A-DAY      09                                                Medical



     50 PLUS                                                        Branch



     ORAL)                                                        

 

     Cholecalcif      2019-0      Yes                      Take by           Uni

vers



     yassine,      7-                               mouth.           ity of



     Vitamin D3,      21:25:                                              [place]



     3,000 unit      09                                                Medical



     Tab                                                         Branch

 

     vit B      2019-0      Yes                      Take by           [facility]



     complex      7-                               mouth.           ity of



     no.12/niaci      21:25:                                              [place]



     n,B3,      09                                                Medical



     (VITAMIN B                                                        Branch



     COMPLEX                                                        



     NO.12-NIACI                                                        



     N ORAL)                                                        

 

     omega-3      2019-0      Yes                      Take by           [facility]



     fatty                                     mouth.           ity of



     acids/dha/e      21:25:                                              [place]



     pa (MEGARED      09                                                Medical



     PLANT-OMEGA                                                        Branch



     -3 ORAL)                                                        

 

     multivitami      -0      Yes                      Take by           Uni

vers



     n with      7-                               mouth.           ity of



     minerals      21:25:                                              [place]



     (ONE-A-DAY      09                                                Medical



     50 PLUS                                                        Branch



     ORAL)                                                        

 

     Cholecalcif      2019-0      Yes                      Take by           Uni

vers



     yassine,      7-                               mouth.           ity of



     Vitamin D3,      21:25:                                              [place]



     3,000 unit      09                                                Medical



     Tab                                                         Branch

 

     vit B      2019-0      Yes                      Take by           [facility]



     complex      7                               mouth.           ity of



     no.12/niaci      21:25:                                              [place]



     n,B3,      09                                                Medical



     (VITAMIN B                                                        Branch



     COMPLEX                                                        



     NO.12-NIACI                                                        



     N ORAL)                                                        

 

     omega-3      2019-0      Yes                      Take by           Univers



     fatty                                     mouth.           ity of



     acids/dha/e      21:25:                                              [place]



     pa (MEGARED      09                                                Medical



     PLANT-OMEGA                                                        Branch



     -3 ORAL)                                                        

 

     multivitami      2019-0      Yes                      Take by           Uni

vers



     n with      7-09                               mouth.           ity of



     minerals      21:25:                                              [place]



     (ONE-A-DAY      09                                                Medical



     50 PLUS                                                        Branch



     ORAL)                                                        

 

     Cholecalcif      2019-0      Yes                      Take by           Uni

vers



     yassine,      7-                               mouth.           ity of



     Vitamin D3,      21:25:                                              [place]



     3,000 unit      09                                                Medical



     Tab                                                         Branch

 

     vit B      2019-0      Yes                      Take by           [facility]



     complex      7-                               mouth.           ity of



     no.12/niaci      21:25:                                              [place]



     n,B3,      09                                                Medical



     (VITAMIN B                                                        Branch



     COMPLEX                                                        



     NO.12-NIACI                                                        



     N ORAL)                                                        

 

     omega-3      2019-0      Yes                      Take by           [facility]



     fatty                                     mouth.           ity of



     acids/dha/e      21:25:                                              [place]



     pa (MEGARED      09                                                Medical



     PLANT-OMEGA                                                        Branch



     -3 ORAL)                                                        

 

     aspirin            Yes                      Take by           [facility]



     (ASPIR-81      7-09                               mouth.           ity of



     ORAL)      21:08:                                              [address]

 

     aspirin            Yes                      Take by           [facility]



     (ASPIR-81      7-09                               mouth.           ity of



     ORAL)      21:08:                                              [address]

 

     aspirin            Yes                      Take by           [facility]



     (ASPIR-81      7-09                               mouth.           ity of



     ORAL)      21:08:                                              [address]

 

     aspirin            Yes                      Take by           [facility]



     (ASPIR-81      7-09                               mouth.           ity of



     ORAL)      21:08:                                              [address]



                                                                 Branch

 

     atorvastati            Yes                      TAKE 1           Univ

ers



     n 10 mg      6-14                               TABLET BY           ity of



     tablet      00:00:                               MOUTH ONCE           [place]



               00                                 DAILY FOR           Medical



                                                  90 DAYS           Branch

 

     atorvastati            Yes                      TAKE 1           Univ

ers



     n 10 mg      6-14                               TABLET BY           ity of



     tablet      00:00:                               MOUTH ONCE           [place]



               00                                 DAILY FOR           Medical



                                                  90 DAYS           Branch

 

     atorvastati            Yes                      TAKE 1           Univ

ers



     n 10 mg      6-14                               TABLET BY           ity of



     tablet      00:00:                               MOUTH ONCE           [place]



               00                                 DAILY FOR           Medical



                                                  90 DAYS           Branch

 

     atorvastati            Yes                      TAKE 1           Univ

ers



     n 10 mg      6-14                               TABLET BY           ity of



     tablet      00:00:                               MOUTH ONCE           [place]



                                                DAILY FOR           Medical



                                                  90 DAYS           Branch

 

     propranolol      -      Yes                                     [facility]iver

s



     20 mg      5-07                                              ity of



     tablet      00:00:                                              [place]



               00                                                Medical



                                                                 Branch

 

     propranolol      2019-0      Yes                                     Univer

s



     20 mg      5-07                                              ity of



     tablet      00:00:                                              [place]



               00                                                Medical



                                                                 Branch

 

     propranolol      2019-0      Yes                                     Univer

s



     20 mg      5-07                                              ity of



     tablet      00:00:                                              [place]



               00                                                Medical



                                                                 Branch

 

     propranolol      -0      Yes                                     Univer

s



     20 mg      5-07                                              ity of



     tablet      00:00:                                              [place]



               00                                                Medical



                                                                 Branch

 

     enalapril            Yes                                     [facility]



     (VASOTEC)      8-15                                              ity of



     20 mg      00:00:                                              [place]



     tablet      00                                                Medical



                                                                 Branch

 

     glimepiride            Yes                                     Univer

s



     (AMARYL) 4      8-15                                              ity of



     mg tablet      00:00:                                              [place]



               00                                                Medical



                                                                 Branch

 

     metFORMIN            Yes                                     [facility]



     (GLUCOPHAGE      8-15                                              ity of



     ) 1,000 mg      00:00:                                              [place]



     tablet      00                                                Medical



                                                                 Branch

 

     enalapril            Yes                                     [facility]



     (VASOTEC)      8-15                                              ity of



     20 mg      00:00:                                              [place]



     tablet      00                                                [facility]

 

     glimepiride            Yes                                     Univer

s



     (AMARYL) 4      8-15                                              ity of



     mg tablet      00:00:                                              [place]



               00                                                [facility]

 

     metFORMIN            Yes                                     [facility]



     (GLUCOPHAGE      8-15                                              ity of



     ) 1,000 mg      00:00:                                              [place]



     tablet      00                                                [facility]

 

     enalapril            Yes                                     [facility]



     (VASOTEC)      8-15                                              ity of



     20 mg      00:00:                                              [place]



     tablet      00                                                [facility]

 

     glimepiride            Yes                                     Univer

s



     (AMARYL) 4      8-15                                              ity of



     mg tablet      00:00:                                              [place]



                                                               [facility]

 

     metFORMIN            Yes                                     [facility]



     (GLUCOPHAGE      8-15                                              ity of



     ) 1,000 mg      00:00:                                              [place]



     tablet      00                                                [facility]

 

     enalapril            Yes                                     [facility]



     (VASOTEC)      8-15                                              ity of



     20 mg      00:00:                                              [place]



     tablet                                                      [facility]

 

     glimepiride            Yes                                     Univer

s



     (AMARYL) 4      8-15                                              ity of



     mg tablet      00:00:                                              [place]



                                                               [facility]

 

     metFORMIN            Yes                                     [facility]



     (GLUCOPHAGE      8-15                                              ity of



     ) 1,000 mg      00:00:                                              [place]



     tablet      00                                                [facility]

 

     Propranolol Propranolol           Yes  [facility]                1 tablet     

      Common



     HCl  HCl                                                    [facility]

 

     Metformin Metformin           Yes  [facility]                1 tablet         

  Common



     HCl  HCl                                     with meals           [facility]

 

     Tamsulosin Tamsulosin           Yes  [facility]                1 capsule      

     Common



     HCl  HCl                                                    [facility]

 

     Glyxambi Glyxambi           Yes  [facility]                1              Comm

on



                                                                 [facility]

 

     Pioglitazon Pioglitazon           Yes  [facility]                1 tablet     

      Common



     e HCl e HCl                                                   [facility]

 

     Enalapril Enalapril           Yes  [facility]                1 tablet         

  Common



     Maleate Maleate                                                   [facility]

 

     Glimepiride Glimepiride           Yes  [facility]                1 tablet     

      Common



                                                  with           [facility]



                                                  breakfast           - [facility]



                                                  or the           [facility]



                                                  meal of           Medical



                                                  the day           [place]

 

     Myrbetriq Myrbetriq           Yes  [facility]                1 tablet         

  Common



                                                                 [facility]

 

     Amaryl Amaryl           Yes  [facility]                1 tablet           Comm

on



                                                  with food           [facility]

 

     Gabapentin Gabapentin           Yes  [facility]                1 capsule      

     Common



                                                                 [facility]

 

     Finasteride Finasteride           Yes  [facility]                1 tablet     

      Common



                                                                 [facility]

 

     Gabapentin Gabapentin           Yes  [facility]                1 capsule      

     [facility]

 

     Atorvastati Atorvastati           Yes  [facility]                1 tablet     

      Common



     n Calcium n Calcium                                                   [facility]

[facility]

 

     SMZ-TMP DS SMZ-TMP DS           Yes  [facility]                not            

Common



                                                  defined           [facility]

 

     Potassium Potassium           No             1{table      Potassium        

   



     Chloride ER Chloride ER                          t_with_      Chloride     

      



     20 MEQ 20 MEQ                          food}      ER 20 MEQ           

 

     SMZ-TMP DS SMZ-TMP DS           No                       SMZ-TMP DS        

   

 

     Oxybutynin Oxybutynin           No             1{table QD   Oxybutynin     

      



     Chloride 5 Chloride 5                          t}        Chloride 5        

   



     MG   MG                                      MG             

 

     Myrbetriq Myrbetriq           No             1{table BID  Myrbetriq        

   



     25 MG 25 MG                          t}        25 MG           

 

     Furosemide Furosemide           No             1{table      Furosemide     

      



     40 MG 40 MG                          t}        40 MG           

 

     Amaryl 4 MG Amaryl 4 MG           No             1{table BID  Amaryl 4     

      



                                        t_with_      MG             



                                        food}                     

 

     Potassium Potassium           No             1{table      Potassium        

   



     Chloride ER Chloride ER                          t_with_      Chloride     

      



     20 MEQ 20 MEQ                          food}      ER 20 MEQ           

 

     Tamsulosin Tamsulosin           No             1{capsu QD   Tamsulosin     

      



     HCl 0.4 MG HCl 0.4 MG                          le}       HCl 0.4 MG        

   

 

     Pioglitazon Pioglitazon           No             1{table QD   Pioglitazo   

        



     e HCl 30 MG e HCl 30 MG                          t}        ne HCl 30       

    



                                                  MG             

 

     Glyxambi Glyxambi           No                       Glyxambi           



     25-5 MG 25-5 MG                                    25-5 MG           

 

     Glyxambi Glyxambi           No                       Glyxambi           



     25mg/5mg 25mg/5mg                                    25mg/5mg           

 

     Aspirin 81 Aspirin 81           No             1{table QD   Aspirin 81     

      



     81 MG 81 MG                          t}        81 MG           

 

     Oxybutynin Oxybutynin           No             1{table QD   Oxybutynin     

      



     Chloride 5 Chloride 5                          t}        Chloride 5        

   



     MG   MG                                      MG             

 

     Glimepiride Glimepiride           No             1{table QD   Glimepirid   

        



     4 MG 4 MG                          t_with_      e 4 MG           



                                        breakfa                     



                                        st_or_t                     



                                        he_firs                     



                                        t_main_                     



                                        meal_of                     



                                        _the_da                     



                                        y}                       

 

     Myrbetriq Myrbetriq           No             1{table BID  Myrbetriq        

   



     25 MG 25 MG                          t}        25 MG           

 

     Enalapril Enalapril           No             1{table BID  Enalapril        

   



     Maleate 20 Maleate 20                          t}        Maleate 20        

   



     MG   MG                                      MG             

 

     Propranolol Propranolol           No             1{table BID  Propranolo   

        



     HCl 20 MG HCl 20 MG                          t}        l HCl 20           



                                                  MG             

 

     Finasteride Finasteride           No             1{table QD   Finasterid   

        



     5 MG 5 MG                          t}        e 5 MG           

 

     Propranolol Propranolol           No             1{table QD   Propranolo   

        



     HCl 20 MG HCl 20 MG                          t}        l HCl 20           



                                                  MG             

 

     Gabapentin Gabapentin           No             1{capsu      Gabapentin     

      



     300  MG                          le}       300 MG           

 

     metFORMIN metFORMIN           No             1{table BID  metFORMIN        

   



     HCl 1000 MG HCl 1000 MG                          t_with_      HCl 1000     

      



                                        meals}      MG             

 

     SMZ-TMP DS SMZ-TMP DS           No                       SMZ-TMP DS        

   

 

     Gabapentin Gabapentin           No             1{capsu      Gabapentin     

      



     300  MG                          le}       300 MG           

 

     Atorvastati Atorvastati           No             1{table QD   Atorvastat   

        



     n Calcium n Calcium                          t}        in Calcium          

 



     10 MG 10 MG                                    10 MG           

 

     Enalapril Enalapril           No             1{table BID  Enalapril        

   



     Maleate 20 Maleate 20                          t}        Maleate 20        

   



     MG   MG                                      MG             

 

     Glyxambi Glyxambi           No                       Glyxambi           



     25mg/5mg 25mg/5mg                                    25mg/5mg           

 

     Amaryl 4 MG Amaryl 4 MG           No             1{table BID  Amaryl 4     

      



                                        t_with_      MG             



                                        food}                     

 

     Gabapentin Gabapentin           No             1{capsu      Gabapentin     

      



     300  MG                          le}       300 MG           

 

     Tamsulosin Tamsulosin           No             1{capsu QD   Tamsulosin     

      



     HCl 0.4 MG HCl 0.4 MG                          le}       HCl 0.4 MG        

   

 

     Gabapentin Gabapentin           No             1{capsu      Gabapentin     

      



     300  MG                          le}       300 MG           

 

     Atorvastati Atorvastati           No             1{table QD   Atorvastat   

        



     n Calcium n Calcium                          t}        in Calcium          

 



     10 MG 10 MG                                    10 MG           

 

     Aspirin 81 Aspirin 81           No             1{table QD   Aspirin 81     

      



     81 MG 81 MG                          t}        81 MG           

 

     Furosemide Furosemide           No             1{table      Furosemide     

      



     40 MG 40 MG                          t}        40 MG           

 

     Glimepiride Glimepiride           No             1{table QD   Glimepirid   

        



     4 MG 4 MG                          t_with_      e 4 MG           



                                        breakfa                     



                                        st_or_t                     



                                        he_firs                     



                                        t_main_                     



                                        meal_of                     



                                        _the_da                     



                                        y}                       

 

     Propranolol Propranolol           No             1{table BID  Propranolo   

        



     HCl 20 MG HCl 20 MG                          t}        l HCl 20           



                                                  MG             

 

     metFORMIN metFORMIN           No             1{table BID  metFORMIN        

   



     HCl 1000 MG HCl 1000 MG                          t_with_      HCl 1000     

      



                                        meals}      MG             

 

     Pioglitazon Pioglitazon           No             1{table QD   Pioglitazo   

        



     e HCl 30 MG e HCl 30 MG                          t}        ne HCl 30       

    



                                                  MG             

 

     Glyxambi Glyxambi           No                       Glyxambi           



     25-5 MG 25-5 MG                                    25-5 MG           

 

     SMZ-TMP DS SMZ-TMP DS           No                       SMZ-TMP DS        

   

 

     Propranolol Propranolol           No             1{table QD   Propranolo   

        



     HCl 20 MG HCl 20 MG                          t}        l HCl 20           



                                                  MG             

 

     Oxybutynin Oxybutynin           No             1{table QD   Oxybutynin     

      



     Chloride 5 Chloride 5                          t}        Chloride 5        

   



     MG   MG                                      MG             

 

     Finasteride Finasteride           No             1{table QD   Finasterid   

        



     5 MG 5 MG                          t}        e 5 MG           

 

     Trulicity Trulicity           No                       Trulicity           



     0.75 0.75                                    0.75           



     MG/0.5ML MG/0.5ML                                    MG/0.5ML           

 

     Potassium Potassium           No             1{table      Potassium        

   



     Chloride ER Chloride ER                          t_with_      Chloride     

      



     20 MEQ 20 MEQ                          food}      ER 20 MEQ           

 

     Myrbetriq Myrbetriq           No             1{table BID  Myrbetriq        

   



     25 MG 25 MG                          t}        25 MG           

 

     Enalapril Enalapril           No             1{table BID  Enalapril        

   



     Maleate 20 Maleate 20                          t}        Maleate 20        

   



     MG   MG                                      MG             

 

     Glyxambi Glyxambi           No                       Glyxambi           



     25mg/5mg 25mg/5mg                                    25mg/5mg           

 

     Amaryl 4 MG Amaryl 4 MG           No             1{table BID  Amaryl 4     

      



                                        t_with_      MG             



                                        food}                     

 

     Gabapentin Gabapentin           No             1{capsu      Gabapentin     

      



     300  MG                          le}       300 MG           

 

     Tamsulosin Tamsulosin           No             1{capsu QD   Tamsulosin     

      



     HCl 0.4 MG HCl 0.4 MG                          le}       HCl 0.4 MG        

   

 

     Gabapentin Gabapentin           No             1{capsu      Gabapentin     

      



     300  MG                          le}       300 MG           

 

     Atorvastati Atorvastati           No             1{table QD   Atorvastat   

        



     n Calcium n Calcium                          t}        in Calcium          

 



     10 MG 10 MG                                    10 MG           

 

     Aspirin 81 Aspirin 81           No             1{table QD   Aspirin 81     

      



     81 MG 81 MG                          t}        81 MG           

 

     Furosemide Furosemide           No             1{table      Furosemide     

      



     40 MG 40 MG                          t}        40 MG           

 

     Glimepiride Glimepiride           No             1{table QD   Glimepirid   

        



     4 MG 4 MG                          t_with_      e 4 MG           



                                        breakfa                     



                                        st_or_t                     



                                        he_firs                     



                                        t_main_                     



                                        meal_of                     



                                        _the_da                     



                                        y}                       

 

     Propranolol Propranolol           No             1{table BID  Propranolo   

        



     HCl 20 MG HCl 20 MG                          t}        l HCl 20           



                                                  MG             

 

     metFORMIN metFORMIN           No             1{table BID  metFORMIN        

   



     HCl 1000 MG HCl 1000 MG                          t_with_      HCl 1000     

      



                                        meals}      MG             

 

     Pioglitazon Pioglitazon           No             1{table QD   Pioglitazo   

        



     e HCl 30 MG e HCl 30 MG                          t}        ne HCl 30       

    



                                                  MG             

 

     Glyxambi Glyxambi           No                       Glyxambi           



     25-5 MG 25-5 MG                                    25-5 MG           

 

     SMZ-TMP DS SMZ-TMP DS           No                       SMZ-TMP DS        

   

 

     Propranolol Propranolol           No             1{table QD   Propranolo   

        



     HCl 20 MG HCl 20 MG                          t}        l HCl 20           



                                                  MG             

 

     Oxybutynin Oxybutynin           No             1{table QD   Oxybutynin     

      



     Chloride 5 Chloride 5                          t}        Chloride 5        

   



     MG   MG                                      MG             

 

     Finasteride Finasteride           No             1{table QD   Finasterid   

        



     5 MG 5 MG                          t}        e 5 MG           

 

     Trulicity Trulicity           No                       Trulicity           



     0.75 0.75                                    0.75           



     MG/0.5ML MG/0.5ML                                    MG/0.5ML           

 

     Potassium Potassium           No             1{table      Potassium        

   



     Chloride ER Chloride ER                          t_with_      Chloride     

      



     20 MEQ 20 MEQ                          food}      ER 20 MEQ           

 

     Myrbetriq Myrbetriq           No             1{table BID  Myrbetriq        

   



     25 MG 25 MG                          t}        25 MG           

 

     Gabapentin Gabapentin           No             1{capsu      Gabapentin     

      



     300  MG                          le}       300 MG           

 

     Glyxambi Glyxambi           No                       Glyxambi           



     25mg/5mg 25mg/5mg                                    25mg/5mg           

 

     Finasteride Finasteride           No             1{table QD   Finasterid   

        



     5 MG 5 MG                          t}        e 5 MG           

 

     Aspirin 81 Aspirin 81           No             1{table QD   Aspirin 81     

      



     81 MG 81 MG                          t}        81 MG           

 

     Amaryl 4 MG Amaryl 4 MG           No             1{table BID  Amaryl 4     

      



                                        t_with_      MG             



                                        food}                     

 

     SMZ-TMP DS SMZ-TMP DS           No                       SMZ-TMP DS        

   

 

     metFORMIN metFORMIN           No             1{table BID  metFORMIN        

   



     HCl 1000 MG HCl 1000 MG                          t_with_      HCl 1000     

      



                                        meals}      MG             

 

     Oxybutynin Oxybutynin           No             1{table QD   Oxybutynin     

      



     Chloride 5 Chloride 5                          t}        Chloride 5        

   



     MG   MG                                      MG             

 

     Enalapril Enalapril           No             1{table BID  Enalapril        

   



     Maleate 20 Maleate 20                          t}        Maleate 20        

   



     MG   MG                                      MG             

 

     Furosemide Furosemide           No             1{table      Furosemide     

      



     40 MG 40 MG                          t}        40 MG           

 

     Trulicity Trulicity           No                       Trulicity           



     0.75 0.75                                    0.75           



     MG/0.5ML MG/0.5ML                                    MG/0.5ML           

 

     Atorvastati Atorvastati           No                       Atorvastat      

     



     n Calcium n Calcium                                    in Calcium          

 



     10 MG 10 MG                                    10 MG           

 

     Glyxambi Glyxambi           No                       Glyxambi           



     25-5 MG 25-5 MG                                    25-5 MG           

 

     Pioglitazon Pioglitazon           No             1{table QD   Pioglitazo   

        



     e HCl 30 MG e HCl 30 MG                          t}        ne HCl 30       

    



                                                  MG             

 

     Myrbetriq Myrbetriq           No             1{table BID  Myrbetriq        

   



     25 MG 25 MG                          t}        25 MG           

 

     Propranolol Propranolol           No             1{table BID  Propranolo   

        



     HCl 20 MG HCl 20 MG                          t}        l HCl 20           



                                                  MG             

 

     Gabapentin Gabapentin           No             1{capsu      Gabapentin     

      



     300  MG                          le}       300 MG           

 

     Tamsulosin Tamsulosin           No             1{capsu QD   Tamsulosin     

      



     HCl 0.4 MG HCl 0.4 MG                          le}       HCl 0.4 MG        

   

 

     Potassium Potassium           No             1{table      Potassium        

   



     Chloride ER Chloride ER                          t_with_      Chloride     

      



     20 MEQ 20 MEQ                          food}      ER 20 MEQ           

 

     Glimepiride Glimepiride           No                       Glimepirid      

     



     4 MG 4 MG                                    e 4 MG           

 

     Propranolol Propranolol           No             1{table QD   Propranolo   

        



     HCl 20 MG HCl 20 MG                          t}        l HCl 20           



                                                  MG             

 

     Amaryl 4 MG Amaryl 4 MG           No             1{table BID  Amaryl 4     

      



                                        t_with_      MG             



                                        food}                     

 

     Finasteride Finasteride           No             1{table QD   Finasterid   

        



     5 MG 5 MG                          t}        e 5 MG           

 

     Furosemide Furosemide           No             1{table      Furosemide     

      



     40 MG 40 MG                          t}        40 MG           

 

     Atorvastati Atorvastati           No                       Atorvastat      

     



     n Calcium n Calcium                                    in Calcium          

 



     10 MG 10 MG                                    10 MG           

 

     Gabapentin Gabapentin           No             1{capsu      Gabapentin     

      



     300  MG                          le}       300 MG           

 

     Myrbetriq Myrbetriq           No             1{table BID  Myrbetriq        

   



     25 MG 25 MG                          t}        25 MG           

 

     Potassium Potassium           No             1{table      Potassium        

   



     Chloride ER Chloride ER                          t_with_      Chloride     

      



     20 MEQ 20 MEQ                          food}      ER 20 MEQ           

 

     Tamsulosin Tamsulosin           No             1{capsu QD   Tamsulosin     

      



     HCl 0.4 MG HCl 0.4 MG                          le}       HCl 0.4 MG        

   

 

     Oxybutynin Oxybutynin           No             1{table QD   Oxybutynin     

      



     Chloride 5 Chloride 5                          t}        Chloride 5        

   



     MG   MG                                      MG             

 

     Propranolol Propranolol           No             1{table BID  Propranolo   

        



     HCl 20 MG HCl 20 MG                          t}        l HCl 20           



                                                  MG             

 

     Propranolol Propranolol           No             1{table QD   Propranolo   

        



     HCl 20 MG HCl 20 MG                          t}        l HCl 20           



                                                  MG             

 

     Glyxambi Glyxambi           No                       Glyxambi           



     25mg/5mg 25mg/5mg                                    25mg/5mg           

 

     Glimepiride Glimepiride           No                       Glimepirid      

     



     4 MG 4 MG                                    e 4 MG           

 

     Gabapentin Gabapentin           No             1{capsu      Gabapentin     

      



     300  MG                          le}       300 MG           

 

     Enalapril Enalapril           No             1{table BID  Enalapril        

   



     Maleate 20 Maleate 20                          t}        Maleate 20        

   



     MG   MG                                      MG             

 

     Trulicity Trulicity           No                       Trulicity           



     0.75 0.75                                    0.75           



     MG/0.5ML MG/0.5ML                                    MG/0.5ML           

 

     Pioglitazon Pioglitazon           No             1{table QD   Pioglitazo   

        



     e HCl 30 MG e HCl 30 MG                          t}        ne HCl 30       

    



                                                  MG             

 

     Glyxambi Glyxambi           No                       Glyxambi           



     25-5 MG 25-5 MG                                    25-5 MG           

 

     Aspirin 81 Aspirin 81           No             1{table QD   Aspirin 81     

      



     81 MG 81 MG                          t}        81 MG           

 

     SMZ-TMP DS SMZ-TMP DS           No                       SMZ-TMP DS        

   

 

     metFORMIN metFORMIN           No             1{table BID  metFORMIN        

   



     HCl 1000 MG HCl 1000 MG                          t_with_      HCl 1000     

      



                                        meals}      MG             

 

     Amaryl 4 MG Amaryl 4 MG           No             1{table BID  Amaryl 4     

      



                                        t_with_      MG             



                                        food}                     

 

     Finasteride Finasteride           No             1{table QD   Finasterid   

        



     5 MG 5 MG                          t}        e 5 MG           

 

     Furosemide Furosemide           No             1{table      Furosemide     

      



     40 MG 40 MG                          t}        40 MG           

 

     Atorvastati Atorvastati           No                       Atorvastat      

     



     n Calcium n Calcium                                    in Calcium          

 



     10 MG 10 MG                                    10 MG           

 

     Gabapentin Gabapentin           No             1{capsu      Gabapentin     

      



     300  MG                          le}       300 MG           

 

     Myrbetriq Myrbetriq           No             1{table BID  Myrbetriq        

   



     25 MG 25 MG                          t}        25 MG           

 

     Potassium Potassium           No             1{table      Potassium        

   



     Chloride ER Chloride ER                          t_with_      Chloride     

      



     20 MEQ 20 MEQ                          food}      ER 20 MEQ           

 

     Tamsulosin Tamsulosin           No             1{capsu QD   Tamsulosin     

      



     HCl 0.4 MG HCl 0.4 MG                          le}       HCl 0.4 MG        

   

 

     Oxybutynin Oxybutynin           No             1{table QD   Oxybutynin     

      



     Chloride 5 Chloride 5                          t}        Chloride 5        

   



     MG   MG                                      MG             

 

     Propranolol Propranolol           No             1{table BID  Propranolo   

        



     HCl 20 MG HCl 20 MG                          t}        l HCl 20           



                                                  MG             

 

     Propranolol Propranolol           No             1{table QD   Propranolo   

        



     HCl 20 MG HCl 20 MG                          t}        l HCl 20           



                                                  MG             

 

     Glyxambi Glyxambi           No                       Glyxambi           



     25mg/5mg 25mg/5mg                                    25mg/5mg           

 

     Glimepiride Glimepiride           No                       Glimepirid      

     



     4 MG 4 MG                                    e 4 MG           

 

     Gabapentin Gabapentin           No             1{capsu      Gabapentin     

      



     300  MG                          le}       300 MG           

 

     Enalapril Enalapril           No             1{table BID  Enalapril        

   



     Maleate 20 Maleate 20                          t}        Maleate 20        

   



     MG   MG                                      MG             

 

     Trulicity Trulicity           No                       Trulicity           



     0.75 0.75                                    0.75           



     MG/0.5ML MG/0.5ML                                    MG/0.5ML           

 

     Pioglitazon Pioglitazon           No             1{table QD   Pioglitazo   

        



     e HCl 30 MG e HCl 30 MG                          t}        ne HCl 30       

    



                                                  MG             

 

     Glyxambi Glyxambi           No                       Glyxambi           



     25-5 MG 25-5 MG                                    25-5 MG           

 

     Aspirin 81 Aspirin 81           No             1{table QD   Aspirin 81     

      



     81 MG 81 MG                          t}        81 MG           

 

     SMZ-TMP DS SMZ-TMP DS           No                       SMZ-TMP DS        

   

 

     metFORMIN metFORMIN           No             1{table BID  metFORMIN        

   



     HCl 1000 MG HCl 1000 MG                          t_with_      HCl 1000     

      



                                        meals}      MG             

 

     Finasteride Finasteride           No             1{table QD   Finasterid   

        



     5 MG 5 MG                          t}        e 5 MG           

 

     metFORMIN metFORMIN           No             1{table BID  metFORMIN        

   



     HCl 1000 MG HCl 1000 MG                          t_with_      HCl 1000     

      



                                        meals}      MG             

 

     Gabapentin Gabapentin           No             1{capsu      Gabapentin     

      



     300  MG                          le}       300 MG           

 

     Trulicity Trulicity           No                       Trulicity           



     0.75 0.75                                    0.75           



     MG/0.5ML MG/0.5ML                                    MG/0.5ML           

 

     Amaryl 4 MG Amaryl 4 MG           No             1{table BID  Amaryl 4     

      



                                        t_with_      MG             



                                        food}                     

 

     Oxybutynin Oxybutynin           No             1{table QD   Oxybutynin     

      



     Chloride 5 Chloride 5                          t}        Chloride 5        

   



     MG   MG                                      MG             

 

     Furosemide Furosemide           No             1{table      Furosemide     

      



     40 MG 40 MG                          t}        40 MG           

 

     Enalapril Enalapril           No             1{table BID  Enalapril        

   



     Maleate 20 Maleate 20                          t}        Maleate 20        

   



     MG   MG                                      MG             

 

     Tamsulosin Tamsulosin           No             1{capsu QD   Tamsulosin     

      



     HCl 0.4 MG HCl 0.4 MG                          le}       HCl 0.4 MG        

   

 

     Propranolol Propranolol           No             1{table BID  Propranolo   

        



     HCl 20 MG HCl 20 MG                          t}        l HCl 20           



                                                  MG             

 

     Glyxambi Glyxambi           No                       Glyxambi           



     25mg/5mg 25mg/5mg                                    25mg/5mg           

 

     Atorvastati Atorvastati           No                       Atorvastat      

     



     n Calcium n Calcium                                    in Calcium          

 



     10 MG 10 MG                                    10 MG           

 

     Myrbetriq Myrbetriq           No             1{table BID  Myrbetriq        

   



     25 MG 25 MG                          t}        25 MG           

 

     Pioglitazon Pioglitazon           No             1{table QD   Pioglitazo   

        



     e HCl 30 MG e HCl 30 MG                          t}        ne HCl 30       

    



                                                  MG             

 

     Glyxambi Glyxambi           No                       Glyxambi           



     25-5 MG 25-5 MG                                    25-5 MG           

 

     SMZ-TMP DS SMZ-TMP DS           No                       SMZ-TMP DS        

   

 

     Propranolol Propranolol           No             1{table QD   Propranolo   

        



     HCl 20 MG HCl 20 MG                          t}        l HCl 20           



                                                  MG             

 

     Potassium Potassium           No             1{table      Potassium        

   



     Chloride ER Chloride ER                          t_with_      Chloride     

      



     20 MEQ 20 MEQ                          food}      ER 20 MEQ           

 

     Aspirin 81 Aspirin 81           No             1{table QD   Aspirin 81     

      



     81 MG 81 MG                          t}        81 MG           

 

     Glimepiride Glimepiride           No                       Glimepirid      

     



     4 MG 4 MG                                    e 4 MG           

 

     Gabapentin Gabapentin           No             1{capsu      Gabapentin     

      



     300  MG                          le}       300 MG           

 

     Finasteride Finasteride           No             1{table QD   Finasterid   

        



     5 MG 5 MG                          t}        e 5 MG           

 

     metFORMIN metFORMIN           No             1{table BID  metFORMIN        

   



     HCl 1000 MG HCl 1000 MG                          t_with_      HCl 1000     

      



                                        meals}      MG             

 

     Gabapentin Gabapentin           No             1{capsu      Gabapentin     

      



     300  MG                          le}       300 MG           

 

     SMZ-TMP DS SMZ-TMP DS           No                       SMZ-TMP DS        

   

 

     Amaryl 4 MG Amaryl 4 MG           No             1{table BID  Amaryl 4     

      



                                        t_with_      MG             



                                        food}                     

 

     Tamsulosin Tamsulosin           No             1{capsu QD   Tamsulosin     

      



     HCl 0.4 MG HCl 0.4 MG                          le}       HCl 0.4 MG        

   

 

     Furosemide Furosemide           No             1{table      Furosemide     

      



     40 MG 40 MG                          t}        40 MG           

 

     Enalapril Enalapril           No             1{table BID  Enalapril        

   



     Maleate 20 Maleate 20                          t}        Maleate 20        

   



     MG   MG                                      MG             

 

     Pioglitazon Pioglitazon           No             1{table QD   Pioglitazo   

        



     e HCl 30 MG e HCl 30 MG                          t}        ne HCl 30       

    



                                                  MG             

 

     Propranolol Propranolol           No             1{table BID  Propranolo   

        



     HCl 20 MG HCl 20 MG                          t}        l HCl 20           



                                                  MG             

 

     Atorvastati Atorvastati           No                       Atorvastat      

     



     n Calcium n Calcium                                    in Calcium          

 



     10 MG 10 MG                                    10 MG           

 

     Trulicity Trulicity           No                       Trulicity           



     0.75 0.75                                    0.75           



     MG/0.5ML MG/0.5ML                                    MG/0.5ML           

 

     Oxybutynin Oxybutynin           No             1{table QD   Oxybutynin     

      



     Chloride 5 Chloride 5                          t}        Chloride 5        

   



     MG   MG                                      MG             

 

     Propranolol Propranolol           No             1{table QD   Propranolo   

        



     HCl 20 MG HCl 20 MG                          t}        l HCl 20           



                                                  MG             

 

     Glyxambi Glyxambi           No                       Glyxambi           



     25-5 MG 25-5 MG                                    25-5 MG           

 

     Glimepiride Glimepiride           No                       Glimepirid      

     



     4 MG 4 MG                                    e 4 MG           

 

     Glyxambi Glyxambi           No                       Glyxambi           



     25mg/5mg 25mg/5mg                                    25mg/5mg           

 

     Potassium Potassium           No             1{table      Potassium        

   



     Chloride ER Chloride ER                          t_with_      Chloride     

      



     20 MEQ 20 MEQ                          food}      ER 20 MEQ           

 

     Aspirin 81 Aspirin 81           No             1{table QD   Aspirin 81     

      



     81 MG 81 MG                          t}        81 MG           

 

     Myrbetriq Myrbetriq           No             1{table BID  Myrbetriq        

   



     25 MG 25 MG                          t}        25 MG           

 

     Gabapentin Gabapentin           No             1{capsu      Gabapentin     

      



     300  MG                          le}       300 MG           

 

     Finasteride Finasteride           No             1{table QD                

  



     5 MG 5 MG                          t}                       

 

     metFORMIN metFORMIN           No             1{table BID                 



     HCl 1000 MG HCl 1000 MG                          t_with_                   

  



                                        meals}                     

 

     Gabapentin Gabapentin           No             1{capsu                     



     300  MG                          le}                      

 

     SMZ-TMP DS SMZ-TMP DS           No                                      

 

     Amaryl 4 MG Amaryl 4 MG           No             1{table BID               

  



                                        t_with_                     



                                        food}                     

 

     Tamsulosin Tamsulosin           No             1{capsu QD                  



     HCl 0.4 MG HCl 0.4 MG                          le}                      

 

     Furosemide Furosemide           No             1{table                     



     40 MG 40 MG                          t}                       

 

     Enalapril Enalapril           No             1{table BID                 



     Maleate 20 Maleate 20                          t}                       



     MG   MG                                                     

 

     Pioglitazon Pioglitazon           No             1{table QD                

  



     e HCl 30 MG e HCl 30 MG                          t}                       

 

     Propranolol Propranolol           No             1{table BID               

  



     HCl 20 MG HCl 20 MG                          t}                       

 

     Atorvastati Atorvastati           No                                      



     n Calcium n Calcium                                                   



     10 MG 10 MG                                                   

 

     Trulicity Trulicity           No                                      



     0.75 0.75                                                   



     MG/0.5ML MG/0.5ML                                                   

 

     Oxybutynin Oxybutynin           No             1{table QD                  



     Chloride 5 Chloride 5                          t}                       



     MG   MG                                                     

 

     Propranolol Propranolol           No             1{table QD                

  



     HCl 20 MG HCl 20 MG                          t}                       

 

     Glyxambi Glyxambi           No                                      



     25-5 MG 25-5 MG                                                   

 

     Glimepiride Glimepiride           No                                      



     4 MG 4 MG                                                   

 

     Glyxambi Glyxambi           No                                      



     25mg/5mg 25mg/5mg                                                   

 

     Potassium Potassium           No             1{table                     



     Chloride ER Chloride ER                          t_with_                   

  



     20 MEQ 20 MEQ                          food}                     

 

     Aspirin 81 Aspirin 81           No             1{table QD                  



     81 MG 81 MG                          t}                       

 

     Myrbetriq Myrbetriq           No             1{table BID                 



     25 MG 25 MG                          t}                       

 

     Gabapentin Gabapentin           No             1{capsu                     



     300  MG                          le}                      

 

     Oxybutynin Oxybutynin           No             1{table QD   Oxybutynin     

      



     Chloride 5 Chloride 5                          t}        Chloride 5        

   



     MG   MG                                      MG             

 

     Potassium Potassium           No             1{table      Potassium        

   



     Chloride ER Chloride ER                          t_with_      Chloride     

      



     20 MEQ 20 MEQ                          food}      ER 20 MEQ           

 

     Enalapril Enalapril           No                       Enalapril           



     Maleate 20 Maleate 20                                    Maleate 20        

   



     MG   MG                                      MG             

 

     metFORMIN metFORMIN           No             1{table BID  metFORMIN        

   



     HCl 1000 MG HCl 1000 MG                          t_with_      HCl 1000     

      



                                        meals}      MG             

 

     Propranolol Propranolol           No             1{table BID  Propranolo   

        



     HCl 20 MG HCl 20 MG                          t}        l HCl 20           



                                                  MG             

 

     Atorvastati Atorvastati           No                       Atorvastat      

     



     n Calcium n Calcium                                    in Calcium          

 



     10 MG 10 MG                                    10 MG           

 

     Glyxambi Glyxambi           No                       Glyxambi           



     25-5 MG 25-5 MG                                    25-5 MG           

 

     Glimepiride Glimepiride           No                       Glimepirid      

     



     4 MG 4 MG                                    e 4 MG           

 

     Finasteride Finasteride           No             1{table QD   Finasterid   

        



     5 MG 5 MG                          t}        e 5 MG           

 

     Propranolol Propranolol           No             1{table QD   Propranolo   

        



     HCl 20 MG HCl 20 MG                          t}        l HCl 20           



                                                  MG             

 

     Pioglitazon Pioglitazon           No             1{table QD   Pioglitazo   

        



     e HCl 30 MG e HCl 30 MG                          t}        ne HCl 30       

    



                                                  MG             

 

     Tamsulosin Tamsulosin           No             1{capsu QD   Tamsulosin     

      



     HCl 0.4 MG HCl 0.4 MG                          le}       HCl 0.4 MG        

   

 

     Gabapentin Gabapentin           No             1{capsu      Gabapentin     

      



     300  MG                          le}       300 MG           

 

     SMZ-TMP DS SMZ-TMP DS           No                       SMZ-TMP DS        

   

 

     Gabapentin Gabapentin           No             1{capsu      Gabapentin     

      



     300  MG                          le}       300 MG           

 

     Myrbetriq Myrbetriq           No             1{table BID  Myrbetriq        

   



     25 MG 25 MG                          t}        25 MG           

 

     Furosemide Furosemide           No             1{table      Furosemide     

      



     40 MG 40 MG                          t}        40 MG           

 

     Trulicity Trulicity           No                       Trulicity           



     0.75 0.75                                    0.75           



     MG/0.5ML MG/0.5ML                                    MG/0.5ML           

 

     Aspirin 81 Aspirin 81           No             1{table QD   Aspirin 81     

      



     81 MG 81 MG                          t}        81 MG           

 

     Amaryl 4 MG Amaryl 4 MG           No             1{table BID  Amaryl 4     

      



                                        t_with_      MG             



                                        food}                     

 

     Glyxambi Glyxambi           No                       Glyxambi           



     25mg/5mg 25mg/5mg                                    25mg/5mg           

 

     Oxybutynin Oxybutynin           No             1{table QD   Oxybutynin     

      



     Chloride 5 Chloride 5                          t}        Chloride 5        

   



     MG   MG                                      MG             

 

     Potassium Potassium           No             1{table      Potassium        

   



     Chloride ER Chloride ER                          t_with_      Chloride     

      



     20 MEQ 20 MEQ                          food}      ER 20 MEQ           

 

     Enalapril Enalapril           No                       Enalapril           



     Maleate 20 Maleate 20                                    Maleate 20        

   



     MG   MG                                      MG             

 

     metFORMIN metFORMIN           No             1{table BID  metFORMIN        

   



     HCl 1000 MG HCl 1000 MG                          t_with_      HCl 1000     

      



                                        meals}      MG             

 

     Propranolol Propranolol           No             1{table BID  Propranolo   

        



     HCl 20 MG HCl 20 MG                          t}        l HCl 20           



                                                  MG             

 

     Atorvastati Atorvastati           No                       Atorvastat      

     



     n Calcium n Calcium                                    in Calcium          

 



     10 MG 10 MG                                    10 MG           

 

     Glyxambi Glyxambi           No                       Glyxambi           



     25-5 MG 25-5 MG                                    25-5 MG           

 

     Glimepiride Glimepiride           No                       Glimepirid      

     



     4 MG 4 MG                                    e 4 MG           

 

     Finasteride Finasteride           No             1{table QD   Finasterid   

        



     5 MG 5 MG                          t}        e 5 MG           

 

     Propranolol Propranolol           No             1{table QD   Propranolo   

        



     HCl 20 MG HCl 20 MG                          t}        l HCl 20           



                                                  MG             

 

     Pioglitazon Pioglitazon           No             1{table QD   Pioglitazo   

        



     e HCl 30 MG e HCl 30 MG                          t}        ne HCl 30       

    



                                                  MG             

 

     Tamsulosin Tamsulosin           No             1{capsu QD   Tamsulosin     

      



     HCl 0.4 MG HCl 0.4 MG                          le}       HCl 0.4 MG        

   

 

     Gabapentin Gabapentin           No             1{capsu      Gabapentin     

      



     300  MG                          le}       300 MG           

 

     SMZ-TMP DS SMZ-TMP DS           No                       SMZ-TMP DS        

   

 

     Gabapentin Gabapentin           No             1{capsu      Gabapentin     

      



     300  MG                          le}       300 MG           

 

     Myrbetriq Myrbetriq           No             1{table BID  Myrbetriq        

   



     25 MG 25 MG                          t}        25 MG           

 

     Furosemide Furosemide           No             1{table      Furosemide     

      



     40 MG 40 MG                          t}        40 MG           

 

     Trulicity Trulicity           No                       Trulicity           



     0.75 0.75                                    0.75           



     MG/0.5ML MG/0.5ML                                    MG/0.5ML           

 

     Aspirin 81 Aspirin 81           No             1{table QD   Aspirin 81     

      



     81 MG 81 MG                          t}        81 MG           

 

     Amaryl 4 MG Amaryl 4 MG           No             1{table BID  Amaryl 4     

      



                                        t_with_      MG             



                                        food}                     

 

     Glyxambi Glyxambi           No                       Glyxambi           



     25mg/5mg 25mg/5mg                                    25mg/5mg           

 

     Oxybutynin Oxybutynin           No             1{table QD   Oxybutynin     

      



     Chloride 5 Chloride 5                          t}        Chloride 5        

   



     MG   MG                                      MG             

 

     Potassium Potassium           No             1{table      Potassium        

   



     Chloride ER Chloride ER                          t_with_      Chloride     

      



     20 MEQ 20 MEQ                          food}      ER 20 MEQ           

 

     Enalapril Enalapril           No                       Enalapril           



     Maleate 20 Maleate 20                                    Maleate 20        

   



     MG   MG                                      MG             

 

     metFORMIN metFORMIN           No             1{table BID  metFORMIN        

   



     HCl 1000 MG HCl 1000 MG                          t_with_      HCl 1000     

      



                                        meals}      MG             

 

     Propranolol Propranolol           No             1{table BID  Propranolo   

        



     HCl 20 MG HCl 20 MG                          t}        l HCl 20           



                                                  MG             

 

     Atorvastati Atorvastati           No                       Atorvastat      

     



     n Calcium n Calcium                                    in Calcium          

 



     10 MG 10 MG                                    10 MG           

 

     Glyxambi Glyxambi           No                       Glyxambi           



     25-5 MG 25-5 MG                                    25-5 MG           

 

     Glimepiride Glimepiride           No                       Glimepirid      

     



     4 MG 4 MG                                    e 4 MG           

 

     Finasteride Finasteride           No             1{table QD   Finasterid   

        



     5 MG 5 MG                          t}        e 5 MG           

 

     Propranolol Propranolol           No             1{table QD   Propranolo   

        



     HCl 20 MG HCl 20 MG                          t}        l HCl 20           



                                                  MG             

 

     Pioglitazon Pioglitazon           No             1{table QD   Pioglitazo   

        



     e HCl 30 MG e HCl 30 MG                          t}        ne HCl 30       

    



                                                  MG             

 

     Tamsulosin Tamsulosin           No             1{capsu QD   Tamsulosin     

      



     HCl 0.4 MG HCl 0.4 MG                          le}       HCl 0.4 MG        

   

 

     Gabapentin Gabapentin           No             1{capsu      Gabapentin     

      



     300  MG                          le}       300 MG           

 

     SMZ-TMP DS SMZ-TMP DS           No                       SMZ-TMP DS        

   

 

     Gabapentin Gabapentin           No             1{capsu      Gabapentin     

      



     300  MG                          le}       300 MG           

 

     Myrbetriq Myrbetriq           No             1{table BID  Myrbetriq        

   



     25 MG 25 MG                          t}        25 MG           

 

     Furosemide Furosemide           No             1{table      Furosemide     

      



     40 MG 40 MG                          t}        40 MG           

 

     Trulicity Trulicity           No                       Trulicity           



     0.75 0.75                                    0.75           



     MG/0.5ML MG/0.5ML                                    MG/0.5ML           

 

     Aspirin 81 Aspirin 81           No             1{table QD   Aspirin 81     

      



     81 MG 81 MG                          t}        81 MG           

 

     Amaryl 4 MG Amaryl 4 MG           No             1{table BID  Amaryl 4     

      



                                        t_with_      MG             



                                        food}                     

 

     Glyxambi Glyxambi           No                       Glyxambi           



     25mg/5mg 25mg/5mg                                    25mg/5mg           

 

     Glyxambi Glyxambi           No                       Glyxambi           



     25-5 MG 25-5 MG                                    25-5 MG           

 

     Glyxambi Glyxambi           No                       Glyxambi           



     25mg/5mg 25mg/5mg                                    25mg/5mg           

 

     Atorvastati Atorvastati           No                       Atorvastat      

     



     n Calcium n Calcium                                    in Calcium          

 



     10 MG 10 MG                                    10 MG           

 

     Pioglitazon Pioglitazon           No                       Pioglitazo      

     



     e HCl 30 MG e HCl 30 MG                                    ne HCl 30       

    



                                                  MG             

 

     Finasteride Finasteride           No             1{table QD   Finasterid   

        



     5 MG 5 MG                          t}        e 5 MG           

 

     Amaryl 4 MG Amaryl 4 MG           No             1{table BID  Amaryl 4     

      



                                        t_with_      MG             



                                        food}                     

 

     Gabapentin Gabapentin           No             1{capsu      Gabapentin     

      



     300  MG                          le}       300 MG           

 

     Myrbetriq Myrbetriq           No             1{table BID  Myrbetriq        

   



     25 MG 25 MG                          t}        25 MG           

 

     Oxybutynin Oxybutynin           No             1{table QD   Oxybutynin     

      



     Chloride 5 Chloride 5                          t}        Chloride 5        

   



     MG   MG                                      MG             

 

     Aspirin 81 Aspirin 81           No             1{table QD   Aspirin 81     

      



     81 MG 81 MG                          t}        81 MG           

 

     Trulicity Trulicity           No                       Trulicity           



     0.75 0.75                                    0.75           



     MG/0.5ML MG/0.5ML                                    MG/0.5ML           

 

     SMZ-TMP DS SMZ-TMP DS           No                       SMZ-TMP DS        

   

 

     Propranolol Propranolol           No             1{table BID  Propranolo   

        



     HCl 20 MG HCl 20 MG                          t}        l HCl 20           



                                                  MG             

 

     Furosemide Furosemide           No             1{table      Furosemide     

      



     40 MG 40 MG                          t}        40 MG           

 

     Propranolol Propranolol           No             1{table QD   Propranolo   

        



     HCl 20 MG HCl 20 MG                          t}        l HCl 20           



                                                  MG             

 

     Glimepiride Glimepiride           No                       Glimepirid      

     



     4 MG 4 MG                                    e 4 MG           

 

     Pioglitazon Pioglitazon           No             1{table QD   Pioglitazo   

        



     e HCl 30 MG e HCl 30 MG                          t}        ne HCl 30       

    



                                                  MG             

 

     Potassium Potassium           No             1{table      Potassium        

   



     Chloride ER Chloride ER                          t_with_      Chloride     

      



     20 MEQ 20 MEQ                          food}      ER 20 MEQ           

 

     Tamsulosin Tamsulosin           No             1{capsu QD   Tamsulosin     

      



     HCl 0.4 MG HCl 0.4 MG                          le}       HCl 0.4 MG        

   

 

     metFORMIN metFORMIN           No             1{table BID  metFORMIN        

   



     HCl 1000 MG HCl 1000 MG                          t_with_      HCl 1000     

      



                                        meals}      MG             

 

     Enalapril Enalapril           No                       Enalapril           



     Maleate 20 Maleate 20                                    Maleate 20        

   



     MG   MG                                      MG             

 

     Gabapentin Gabapentin           No             1{capsu      Gabapentin     

      



     300  MG                          le}       300 MG           

 

     Glyxambi Glyxambi           No                       Glyxambi           



     25mg/5mg 25mg/5mg                                    25mg/5mg           

 

     Aspirin 81 Aspirin 81           No             1{table QD   Aspirin 81     

      



     81 MG 81 MG                          t}        81 MG           

 

     metFORMIN metFORMIN           No             1{table BID  metFORMIN        

   



     HCl 1000 MG HCl 1000 MG                          t_with_      HCl 1000     

      



                                        meals}      MG             

 

     Propranolol Propranolol           No             1{table QD   Propranolo   

        



     HCl 20 MG HCl 20 MG                          t}        l HCl 20           



                                                  MG             

 

     Pioglitazon Pioglitazon           No             1{table QD   Pioglitazo   

        



     e HCl 30 MG e HCl 30 MG                          t}        ne HCl 30       

    



                                                  MG             

 

     Oxybutynin Oxybutynin           No             1{table QD   Oxybutynin     

      



     Chloride 5 Chloride 5                          t}        Chloride 5        

   



     MG   MG                                      MG             

 

     Amaryl 4 MG Amaryl 4 MG           No             1{table BID  Amaryl 4     

      



                                        t_with_      MG             



                                        food}                     

 

     SMZ-TMP DS SMZ-TMP DS           No                       SMZ-TMP DS        

   

 

     Glimepiride Glimepiride           No                       Glimepirid      

     



     4 MG 4 MG                                    e 4 MG           

 

     Myrbetriq Myrbetriq           No             1{table BID  Myrbetriq        

   



     25 MG 25 MG                          t}        25 MG           

 

     Enalapril Enalapril           No                       Enalapril           



     Maleate 20 Maleate 20                                    Maleate 20        

   



     MG   MG                                      MG             

 

     Atorvastati Atorvastati           No                       Atorvastat      

     



     n Calcium n Calcium                                    in Calcium          

 



     10 MG 10 MG                                    10 MG           

 

     Finasteride Finasteride           No             1{table QD   Finasterid   

        



     5 MG 5 MG                          t}        e 5 MG           

 

     Trulicity Trulicity           No                       Trulicity           



     0.75 0.75                                    0.75           



     MG/0.5ML MG/0.5ML                                    MG/0.5ML           

 

     Gabapentin Gabapentin           No             1{capsu      Gabapentin     

      



     300  MG                          le}       300 MG           

 

     Glyxambi Glyxambi           No                       Glyxambi           



     25-5 MG 25-5 MG                                    25-5 MG           

 

     Pioglitazon Pioglitazon           No                       Pioglitazo      

     



     e HCl 30 MG e HCl 30 MG                                    ne HCl 30       

    



                                                  MG             

 

     Gabapentin Gabapentin           No             1{capsu      Gabapentin     

      



     300  MG                          le}       300 MG           

 

     Glyxambi Glyxambi           No                       Glyxambi           



     25mg/5mg 25mg/5mg                                    25mg/5mg           

 

     Aspirin 81 Aspirin 81           No             1{table QD   Aspirin 81     

      



     81 MG 81 MG                          t}        81 MG           

 

     metFORMIN metFORMIN           No             1{table BID  metFORMIN        

   



     HCl 1000 MG HCl 1000 MG                          t_with_      HCl 1000     

      



                                        meals}      MG             

 

     Propranolol Propranolol           No             1{table QD   Propranolo   

        



     HCl 20 MG HCl 20 MG                          t}        l HCl 20           



                                                  MG             

 

     Pioglitazon Pioglitazon           No             1{table QD   Pioglitazo   

        



     e HCl 30 MG e HCl 30 MG                          t}        ne HCl 30       

    



                                                  MG             

 

     Oxybutynin Oxybutynin           No             1{table QD   Oxybutynin     

      



     Chloride 5 Chloride 5                          t}        Chloride 5        

   



     MG   MG                                      MG             

 

     Amaryl 4 MG Amaryl 4 MG           No             1{table BID  Amaryl 4     

      



                                        t_with_      MG             



                                        food}                     

 

     SMZ-TMP DS SMZ-TMP DS           No                       SMZ-TMP DS        

   

 

     Glimepiride Glimepiride           No                       Glimepirid      

     



     4 MG 4 MG                                    e 4 MG           

 

     Myrbetriq Myrbetriq           No             1{table BID  Myrbetriq        

   



     25 MG 25 MG                          t}        25 MG           

 

     Enalapril Enalapril           No                       Enalapril           



     Maleate 20 Maleate 20                                    Maleate 20        

   



     MG   MG                                      MG             

 

     Atorvastati Atorvastati           No                       Atorvastat      

     



     n Calcium n Calcium                                    in Calcium          

 



     10 MG 10 MG                                    10 MG           

 

     Finasteride Finasteride           No             1{table QD   Finasterid   

        



     5 MG 5 MG                          t}        e 5 MG           

 

     Trulicity Trulicity           No                       Trulicity           



     0.75 0.75                                    0.75           



     MG/0.5ML MG/0.5ML                                    MG/0.5ML           

 

     Gabapentin Gabapentin           No             1{capsu      Gabapentin     

      



     300  MG                          le}       300 MG           

 

     Glyxambi Glyxambi           No                       Glyxambi           



     25-5 MG 25-5 MG                                    25-5 MG           

 

     Pioglitazon Pioglitazon           No                       Pioglitazo      

     



     e HCl 30 MG e HCl 30 MG                                    ne HCl 30       

    



                                                  MG             

 

     Gabapentin Gabapentin           No             1{capsu      Gabapentin     

      



     300  MG                          le}       300 MG           

 

     Glyxambi Glyxambi           No                       Glyxambi           



     25mg/5mg 25mg/5mg                                    25mg/5mg           

 

     Aspirin 81 Aspirin 81           No             1{table QD   Aspirin 81     

      



     81 MG 81 MG                          t}        81 MG           

 

     metFORMIN metFORMIN           No             1{table BID  metFORMIN        

   



     HCl 1000 MG HCl 1000 MG                          t_with_      HCl 1000     

      



                                        meals}      MG             

 

     Propranolol Propranolol           No             1{table QD   Propranolo   

        



     HCl 20 MG HCl 20 MG                          t}        l HCl 20           



                                                  MG             

 

     Pioglitazon Pioglitazon           No             1{table QD   Pioglitazo   

        



     e HCl 30 MG e HCl 30 MG                          t}        ne HCl 30       

    



                                                  MG             

 

     Oxybutynin Oxybutynin           No             1{table QD   Oxybutynin     

      



     Chloride 5 Chloride 5                          t}        Chloride 5        

   



     MG   MG                                      MG             

 

     Amaryl 4 MG Amaryl 4 MG           No             1{table BID  Amaryl 4     

      



                                        t_with_      MG             



                                        food}                     

 

     SMZ-TMP DS SMZ-TMP DS           No                       SMZ-TMP DS        

   

 

     Glimepiride Glimepiride           No                       Glimepirid      

     



     4 MG 4 MG                                    e 4 MG           

 

     Myrbetriq Myrbetriq           No             1{table BID  Myrbetriq        

   



     25 MG 25 MG                          t}        25 MG           

 

     Enalapril Enalapril           No                       Enalapril           



     Maleate 20 Maleate 20                                    Maleate 20        

   



     MG   MG                                      MG             

 

     Atorvastati Atorvastati           No                       Atorvastat      

     



     n Calcium n Calcium                                    in Calcium          

 



     10 MG 10 MG                                    10 MG           

 

     Finasteride Finasteride           No             1{table QD   Finasterid   

        



     5 MG 5 MG                          t}        e 5 MG           

 

     Trulicity Trulicity           No                       Trulicity           



     0.75 0.75                                    0.75           



     MG/0.5ML MG/0.5ML                                    MG/0.5ML           

 

     Gabapentin Gabapentin           No             1{capsu      Gabapentin     

      



     300  MG                          le}       300 MG           

 

     Glyxambi Glyxambi           No                       Glyxambi           



     25-5 MG 25-5 MG                                    25-5 MG           

 

     Pioglitazon Pioglitazon           No                       Pioglitazo      

     



     e HCl 30 MG e HCl 30 MG                                    ne HCl 30       

    



                                                  MG             

 

     Gabapentin Gabapentin           No             1{capsu      Gabapentin     

      



     300  MG                          le}       300 MG           

 

     Enalapril Enalapril           No                       Enalapril           



     Maleate 20 Maleate 20                                    Maleate 20        

   



     MG   MG                                      MG             

 

     SMZ-TMP DS SMZ-TMP DS           No                       SMZ-TMP DS        

   

 

     Myrbetriq Myrbetriq           No             1{table BID  Myrbetriq        

   



     25 MG 25 MG                          t}        25 MG           

 

     Pioglitazon Pioglitazon           No                       Pioglitazo      

     



     e HCl 30 MG e HCl 30 MG                                    ne HCl 30       

    



                                                  MG             

 

     Tamsulosin Tamsulosin           No             1{capsu QD   Tamsulosin     

      



     HCl 0.4 MG HCl 0.4 MG                          le}       HCl 0.4 MG        

   

 

     metFORMIN metFORMIN           No             1{table BID  metFORMIN        

   



     HCl 1000 MG HCl 1000 MG                          t_with_      HCl 1000     

      



                                        meals}      MG             

 

     Finasteride Finasteride           No             1{table QD   Finasterid   

        



     5 MG 5 MG                          t}        e 5 MG           

 

     Propranolol Propranolol           No             1{table QD   Propranolo   

        



     HCl 20 MG HCl 20 MG                          t}        l HCl 20           



                                                  MG             

 

     Gabapentin Gabapentin           No             1{capsu      Gabapentin     

      



     300  MG                          le}       300 MG           

 

     Gabapentin Gabapentin           No             1{capsu      Gabapentin     

      



     300  MG                          le}       300 MG           

 

     Aspirin 81 Aspirin 81           No             1{table QD   Aspirin 81     

      



     81 MG 81 MG                          t}        81 MG           

 

     Pioglitazon Pioglitazon           No             1{table QD   Pioglitazo   

        



     e HCl 30 MG e HCl 30 MG                          t}        ne HCl 30       

    



                                                  MG             

 

     Amaryl 4 MG Amaryl 4 MG           No             1{table BID  Amaryl 4     

      



                                        t_with_      MG             



                                        food}                     

 

     Atorvastati Atorvastati           No                       Atorvastat      

     



     n Calcium n Calcium                                    in Calcium          

 



     10 MG 10 MG                                    10 MG           

 

     Glyxambi Glyxambi           No                       Glyxambi           



     25mg/5mg 25mg/5mg                                    25mg/5mg           

 

     Trulicity Trulicity           No                       Trulicity           



     0.75 0.75                                    0.75           



     MG/0.5ML MG/0.5ML                                    MG/0.5ML           

 

     Oxybutynin Oxybutynin           No             1{table QD   Oxybutynin     

      



     Chloride 5 Chloride 5                          t}        Chloride 5        

   



     MG   MG                                      MG             

 

     Glyxambi Glyxambi           No                       Glyxambi           



     25-5 MG 25-5 MG                                    25-5 MG           

 

     Glimepiride Glimepiride           No                       Glimepirid      

     



     4 MG 4 MG                                    e 4 MG           

 

     Gabapentin Gabapentin           No             1{capsu      Gabapentin     

      



     300  MG                          le}       300 MG           

 

     Pioglitazon Pioglitazon           No             1{table QD   Pioglitazo   

        



     e HCl 30 MG e HCl 30 MG                          t}        ne HCl 30       

    



                                                  MG             

 

     Atorvastati Atorvastati           No             1{table QD   Atorvastat   

        



     n Calcium n Calcium                          t}        in Calcium          

 



     10 MG 10 MG                                    10 MG           

 

     Propranolol Propranolol           No             1{table BID  Propranolo   

        



     HCl 20 MG HCl 20 MG                          t}        l HCl 20           



                                                  MG             

 

     Finasteride Finasteride           No             1{table QD   Finasterid   

        



     5 MG 5 MG                          t}        e 5 MG           

 

     Aspirin 81 Aspirin 81           No             1{table QD   Aspirin 81     

      



     81 MG 81 MG                          t}        81 MG           

 

     Furosemide Furosemide           No             1{table      Furosemide     

      



     40 MG 40 MG                          t}        40 MG           

 

     Glimepiride Glimepiride           No             1{table QD   Glimepirid   

        



     4 MG 4 MG                          t_with_      e 4 MG           



                                        breakfa                     



                                        st_or_t                     



                                        he_firs                     



                                        t_main_                     



                                        meal_of                     



                                        _the_da                     



                                        y}                       

 

     Amaryl 4 MG Amaryl 4 MG           No             1{table BID  Amaryl 4     

      



                                        t_with_      MG             



                                        food}                     

 

     Glyxambi Glyxambi           No                       Glyxambi           



     25mg/5mg 25mg/5mg                                    25mg/5mg           

 

     Propranolol Propranolol           No             1{table QD   Propranolo   

        



     HCl 20 MG HCl 20 MG                          t}        l HCl 20           



                                                  MG             

 

     Gabapentin Gabapentin           No             1{capsu      Gabapentin     

      



     300  MG                          le}       300 MG           

 

     metFORMIN metFORMIN           No             1{table BID  metFORMIN        

   



     HCl 1000 MG HCl 1000 MG                          t_with_      HCl 1000     

      



                                        meals}      MG             

 

     Enalapril Enalapril           No             1{table BID  Enalapril        

   



     Maleate 20 Maleate 20                          t}        Maleate 20        

   



     MG   MG                                      MG             

 

     Glyxambi Glyxambi           No                       Glyxambi           



     25-5 MG 25-5 MG                                    25-5 MG           

 

     Tamsulosin Tamsulosin           No             1{capsu QD   Tamsulosin     

      



     HCl 0.4 MG HCl 0.4 MG                          le}       HCl 0.4 MG        

   

 

     Tamsulosin Tamsulosin      - No             1{capsu QD   Tamsulosin    

       



     HCl 0.4 MG HCl 0.4 MG                      le}       HCl 0.4 MG       

    



                    00:00                                         



                    :00                                          

 

     Tamsulosin Tamsulosin      - No             1{capsu QD   Tamsulosin    

       



     HCl 0.4 MG HCl 0.4 MG                      le}       HCl 0.4 MG       

    



                    00:00                                         



                    :00                                          

 

     Tamsulosin Tamsulosin      - No             1{capsu QD   Tamsulosin    

       



     HCl 0.4 MG HCl 0.4 MG                      le}       HCl 0.4 MG       

    



                    00:00                                         



                    :00                                          







Immunizations







           Ordered Immunization Filled Immunization Date       Status     Commen

ts   Source



           Name       Name                                        

 

           Modernyuli LYNNID-Puja Moderna COVIDPuja 2021 Completed             [facility]

[facility]



           Vaccine (Low Dose Vaccine (Low Dose 16:32:00                         

- [facility]



           Booster)   Booster)                                    [facility]             [facility]

[facility]



           Vaccine (Low Dose Vaccine (Low Dose 16:32:00                         

- [facility]



           Booster)   [facility]oster)                                    [facility]             [facility]

[facility]



           Vaccine (Low Dose Vaccine (Low Dose 16:32:00                         

- [facility]



           Booster)   Booster)                                    [facility]             [facility]

[facility]



           Vaccine (Low Dose Vaccine (Low Dose 16:32:00                         

- [facility]



           Booster)   Booster)                                    [facility]             [facility]

[facility]



           Vaccine (Low Dose Vaccine (Low Dose 16:32:00                         

- [facility]



           Booster)   Booster)                                    [facility] Com[facility]             [facility]

[facility]



           Vaccine (Low Dose Vaccine (Low Dose 16:32:00                         

- [facility]



           Booster)   Booster)                                    [facility] Com[facility]             [facility]

[facility]



           Vaccine (Low Dose Vaccine (Low Dose 16:32:00                         

- [facility]



           Booster)   Booster)                                    [facility] Com[facility]             [facility]

[facility]



           Vaccine (Low Dose Vaccine (Low Dose 16:32:00                         

- [facility]



           Booster)   Booster)                                    [facility] COVID-19 Moderna COVID19 2021 Completed             [facility]

[facility]



           Vaccine (Low Dose Vaccine (Low Dose 16:32:00                         

- [facility]



           Booster)   Booster)                                    [facility] Com[facility]             [facility]

[facility]



           Vaccine (Low Dose Vaccine (Low Dose 16:32:00                         

- [facility]



           Booster)   Booster)                                    [facility] COVID19 Moderna COVID-19 2021 Completed             Co

[facility]



           Vaccine (Low Dose Vaccine (Low Dose 16:32:00                         

- [facility]



           Booster)   Booster)                                    [facility] COVID19 Moderna COVID-19 2021 Completed             [facility]

[facility]



           Vaccine (Low Dose Vaccine (Low Dose 16:32:00                         

- [facility]



           Booster)   Booster)                                    [facility] COVID19 Moderna COVID-19 2021 Completed             [facility]

[facility]



           Vaccine (Low Dose Vaccine (Low Dose 16:32:00                         

- [facility]



           Booster)   Booster)                                    [facility]

 

           FluAD      FluAD      2021 Completed             [facility]



                                 16:47:00                         - [facility]

 

           FluAD      FluAD      2021 Completed             [facility]



                                 16:47:00                         [facility]

 

           FluAD      FluAD      2021 Completed             [facility]



                                 16:47:00                         [facility]

 

           FluAD      FluAD      2021 Completed             [facility]



                                 16:47:00                         - [facility]

 

           FluAD      FluAD      2021 Completed             [facility]



                                 16:47:00                         [facility]

 

           FluAD      FluAD      2021 Completed             [facility]



                                 16:47:00                         [facility]

 

           FluAD      FluAD      2021 Completed             [facility]



                                 16:47:00                         [facility]

 

           FluAD      FluAD      2021 Completed             [facility]



                                 16:47:00                         [facility]

 

           FluAD      FluAD      2021 Completed             [facility]



                                 16:47:00                         [facility]

 

           FluAD      FluAD      2021 Completed             [facility]



                                 16:47:00                         [facility]

 

           FluAD      FluAD      2021 Completed             [facility]



                                 16:47:00                         [facility]

 

           FluAD      FluAD      2021 Completed             [facility]



                                 16:47:00                         [facility]

 

           FluAD      FluAD      2021 Completed             [facility]



                                 16:47:00                         [facility]

 

           FluAD      FluAD      2021 Completed             [facility]



                                 16:47:00                         [facility]

 

           FluAD      FluAD      2019 Completed             [facility]



                                 16:55:00                         - [facility]

 

           FluAD      FluAD      2019 Completed             [facility]



                                 16:55:00                         - [facility]

 

           FluAD      FluAD      2019 Completed             [facility]



                                 16:55:00                         - [facility]

 

           FluAD      FluAD      2019 Completed             [facility]



                                 16:55:00                         - [facility]

 

           FluAD      FluAD      2019 Completed             [facility]



                                 16:55:00                         - [facility]

 

           FluAD      FluAD      2019 Completed             [facility]



                                 16:55:00                         - [facility]

 

           FluAD      FluAD      2019 Completed             [facility]



                                 16:55:00                         - [facility]

 

           FluAD      FluAD      2019 Completed             [facility]



                                 16:55:00                         - [facility]

 

           FluAD      FluAD      2019 Completed             [facility]



                                 16:55:00                         - [facility]

 

           FluAD      FluAD      2019 Completed             [facility]



                                 16:55:00                         - [facility]

 

           FluAD      FluAD      2019 Completed             [facility]



                                 16:55:00                         - [facility]

 

           FluAD      FluAD      2019 Completed             [facility]



                                 16:55:00                         - [facility]

 

           FluAD      FluAD      2019 Completed             [facility]



                                 16:55:00                         - [facility]

 

           FluAD      FluAD      2019 Completed             [facility]



                                 16:55:00                         - [facility]

 

           FluAD      FluAD      2019 Completed             [facility]



                                 16:55:00                         - [facility]

 

           FluAD      FluAD      2019 Completed             [facility]



                                 16:55:00                         - [facility]

 

           FluAD      FluAD      2019 Completed             [facility]



                                 16:55:00                         - [facility]

 

           FluAD      FluAD      2019 Completed             [facility]



                                 16:55:00                         - [facility]

 

           FluAD      FluAD      2019 Completed             [facility]



                                 00:00:00                         - [facility]

 

           Prevnar 13 Prevnar 13 2019 Completed             [facility]



           -Pneumonia Vaccine -Pneumonia Vaccine 15:49:00                       

  - [facility]

 

           Prevnar 13 Prevnar 13 2019 Completed             [facility]



           -Pneumonia Vaccine -Pneumonia Vaccine 15:49:00                       

  - [facility]

 

           Prevnar 13 Prevnar 13 2019 Completed             [facility]



           -Pneumonia Vaccine -Pneumonia Vaccine 15:49:00                       

  - [facility]

 

           Prevnar 13 Prevnar 13 2019 Completed             [facility]



           -Pneumonia Vaccine -Pneumonia Vaccine 15:49:00                       

  [facility]

 

           Prevnar 13 Prevnar 13 2019 Completed             [facility]



           -Pneumonia Vaccine -Pneumonia Vaccine 15:49:00                       

  - [facility]

 

           Prevnar 13 Prevnar 13 2019 Completed             [facility]



           -Pneumonia Vaccine -Pneumonia Vaccine 15:49:00                       

  [facility]

 

           Prevnar 13 Prevnar 13 2019 Completed             [facility]



           -Pneumonia Vaccine -Pneumonia Vaccine 15:49:00                       

  [facility]

 

           Prevnar 13 Prevnar 13 2019 Completed             [facility]



           -Pneumonia Vaccine -Pneumonia Vaccine 15:49:00                       

  [facility]

 

           Prevnar 13 Prevnar 13 2019 Completed             [facility]



           -Pneumonia Vaccine -Pneumonia Vaccine 15:49:00                       

  - [facility]

 

           Prevnar 13 Prevnar 13 2019 Completed             [facility]



           -Pneumonia Vaccine -Pneumonia Vaccine 15:49:00                       

  [facility]

 

           Prevnar 13 Prevnar 13 2019 Completed             [facility]



           -Pneumonia Vaccine -Pneumonia Vaccine 15:49:00                       

  [facility]

 

           Prevnar 13 Prevnar 13 2019 Completed             [facility]



           -Pneumonia Vaccine -Pneumonia Vaccine 15:49:00                       

  - [facility]

 

           Prevnar 13 Prevnar 13 2019 Completed             [facility]



           -Pneumonia Vaccine -Pneumonia Vaccine 15:49:00                       

  - [facility]

 

           Prevnar 13 Prevnar 13 2019 Completed             [facility]



           -Pneumonia Vaccine -Pneumonia Vaccine 15:49:00                       

  [facility]

 

           Prevnar 13 Prevnar 13 2019 Completed             [facility]



           -Pneumonia Vaccine -Pneumonia Vaccine 15:49:00                       

  [facility]

 

           Prevnar 13 Prevnar 13 2019 Completed             [facility]



           -Pneumonia Vaccine -Pneumonia Vaccine 15:49:00                       

  [facility]

 

           Prevnar 13 Prevnar 13 2019 Completed             [facility]



           -Pneumonia Vaccine -Pneumonia Vaccine 15:49:00                       

  [facility]

 

           Prevnar 13 Prevnar 13 2019 Completed             [facility]



           -Pneumonia Vaccine -Pneumonia Vaccine 15:49:00                       

  - [facility]

 

           Prevnar 13 Prevnar 13 2019 Completed             [facility]



           -Pneumonia Vaccine -Pneumonia Vaccine 00:00:00                       

  - [facility]

 

           FluAD      FluAD      2018 Completed             [facility]



                                 17:04:00                         - [facility]

 

           FluAD      FluAD      2018 Completed             [facility]



                                 17:04:00                         - [facility]

 

           FluAD      FluAD      2018 Completed             [facility]



                                 17:04:00                         - [facility]

 

           FluAD      FluAD      2018 Completed             [facility]



                                 17:04:00                         - [facility]

 

           FluAD      FluAD      2018 Completed             [facility]



                                 17:04:00                         - [facility]

 

           FluAD      FluAD      2018 Completed             [facility]



                                 17:04:00                         - [facility]

 

           FluAD      FluAD      2018 Completed             [facility]



                                 17:04:00                         - [facility]

 

           FluAD      FluAD      2018 Completed             [facility]



                                 17:04:00                         - [facility]

 

           FluAD      FluAD      2018 Completed             [facility]



                                 17:04:00                         - [facility]

 

           FluAD      FluAD      2018 Completed             [facility]



                                 17:04:00                         - [facility]

 

           FluAD      FluAD      2018 Completed             [facility]



                                 17:04:00                         - [facility]

 

           FluAD      FluAD      2018 Completed             [facility]



                                 17:04:00                         - [facility]

 

           FluAD      FluAD      2018 Completed             [facility]



                                 17:04:00                         - [facility]

 

           FluAD      FluAD      2018 Completed             [facility]



                                 17:04:00                         - [facility]

 

           FluAD      FluAD      2018 Completed             [facility]



                                 17:04:00                         - [facility]

 

           FluAD      FluAD      2018 Completed             [facility]



                                 17:04:00                         - [facility]

 

           FluAD      FluAD      2018 Completed             [facility]



                                 17:04:00                         - [facility]

 

           FluAD      FluAD      2018 Completed             [facility]



                                 17:04:00                         - [facility]







Vital Signs







             Vital Name   Observation Time Observation Value Comments     Source

 

             height       2022 16:00:00 68 [in_i]                 [facility]

 

             weight       2022 16:00:00 176.2 [lb_av]              [facility]

 

             temperature  2022 16:00:00 97.7 [degF]               [facility]

[facility]

 

             bmi          2022 16:00:00 26.79 kg/m2               [facility]

[facility]

 

             oximetry     2022 16:00:00 98 %                      [facility]

 

             respiratory rate 2022 16:00:00 15 /min                   Comm

on [facility]

 

             blood pressure 2022 16:00:00 133 mm[Hg]                [facility] -



             systolic                                            [facility]

 

             blood pressure 2022 16:00:00 71 mm[Hg]                 [facility]

 [facility] -



             diastolic                                           [facility]

 

             height       2022 16:00:00 68 [in_i]                 [facility]

 

             weight       2022 16:00:00 177.6 [lb_av]              [facility]

 

             temperature  2022 16:00:00 97.5 [degF]               [facility]

 

             bmi          2022 16:00:00 27 kg/m2                  [facility]

 

             oximetry     2022 16:00:00 99 %                      [facility]

 

             respiratory rate 2022 16:00:00 16 /min                   Comm

on [facility]

 

             blood pressure 2022 16:00:00 139 mm[Hg]                [facility]

 [facility] -



             systolic                                            [facility]

 

             blood pressure 2022 16:00:00 81 mm[Hg]                 [facility] -



             diastolic                                           [facility]

 

             height       2022 10:20:00 68 [in_i]                 [facility]

 

             weight       2022 10:20:00 179 [lb_av]               [facility]

[facility]

 

             bmi          2022 10:20:00 27.21 kg/m2               [facility]

[facility]

 

             height       2022 16:20:00 68 [in_i]                 [facility]

[facility]

 

             weight       2022 16:20:00 177.8 [lb_av]              [facility] -



                                                                 [facility]

 

             temperature  2022 16:20:00 98.0 [degF]               [facility]

[facility]

 

             bmi          2022 16:20:00 27.03 kg/m2               [facility]

 

             oximetry     2022 16:20:00 98 %                      [facility]

[facility]

 

             respiratory rate 2022 16:20:00 15 /min                   Comm

on [facility]

 

             blood pressure 2022 16:20:00 129 mm[Hg]                [facility] -



             systolic                                            [facility]

 

             blood pressure 2022 16:20:00 78 mm[Hg]                 [facility] -



             diastolic                                           [facility]

 

             height       2021 16:00:00 68 [in_i]                 [facility]

 

             weight       2021 16:00:00 186 [lb_av]               [facility]

 

             temperature  2021 16:00:00 97.3 [degF]               [facility]

[facility]

 

             bmi          2021 16:00:00 28.28 kg/m2               [facility]

[facility]

 

             oximetry     2021 16:00:00 99 %                      [facility]

 

             blood pressure 2021 16:00:00 134 mm[Hg]                [facility] -



             systolic                                            [facility]

 

             blood pressure 2021 16:00:00 60 mm[Hg]                 [facility]



             diastolic                                           [facility]

 

             height       2021 16:00:00 68 [in_i]                 [facility]

[facility]

 

             weight       2021 16:00:00 186 [lb_av]               [facility]

[facility]

 

             temperature  2021 16:00:00 97.7 [degF]               [facility]

[facility]

 

             bmi          2021 16:00:00 28.28 kg/m2               [facility]

 

             oximetry     2021 16:00:00 99 %                      [facility]

[facility]

 

             blood pressure 2021 16:00:00 151 mm[Hg]                [facility] -



             systolic                                            [facility]

 

             blood pressure 2021 16:00:00 80 mm[Hg]                 [facility]

 [facility] -



             diastolic                                           [facility]

 

             height       2021 13:00:00 68 [in_i]                 [facility]

 

             weight       2021 13:00:00 182.2 [lb_av]              [facility] 

[facility]

 

             temperature  2021 13:00:00 97.3 [degF]               [facility]

 

             bmi          2021 13:00:00 27.7 kg/m2                [facility]

 

             oximetry     2021 13:00:00 94 %                      [facility]

 

             respiratory rate 2021 13:00:00 18 /min                   Comm

on [facility]

 

             blood pressure 2021 13:00:00 124 mm[Hg]                [facility]



             systolic                                            [facility]

 

             blood pressure 2021 13:00:00 72 mm[Hg]                 [facility] -



             diastolic                                           [facility]

 

             Systolic blood 2019 20:42:00 123 mm[Hg]                [facility]

[facility] of



             pressure                                            [facility]

 

             Diastolic blood 2019 20:42:00 80 mm[Hg]                 Unive

rsity of



             pressure                                            [facility]

 

             Heart rate   2019 20:42:00 101 /min                  [facility]

[facility]

 

             Body temperature 2019 20:42:00 36.67 Joan                 [facility]

 

             Respiratory rate 2019 20:42:00 16 /min                   Un[facility]

[facility]

 

             Body height  2019 20:42:00 177.8 cm                  [facility]

 

             Body weight  2019 20:42:00 82.271 kg                 [facility]

 

             BMI          2019 20:42:00 26.02 kg/m2               [facility]







Procedures







                Procedure       Date / Time Performed Performing Clinician Sourc

e

 

                MR BRAIN WO CONTRAST 2019 15:25:00 Nelson Salcedo

[facility]

 

                ASSIGNMENT OF BENEFITS 2019 14:05:46 Doctor Unassigned, No

 [facility]







Encounters







        Start   End     Encounter Admission Attending Care    Care    Encounter 

Source



        Date/Time Date/Time Type    Type    Clinicians Facility Department ID   

   

 

        2022-11-15         Outpatient                 [facility]     [id number]

 [place]



        11:50:43                                                 [id number]

 

        2022         Outpatient         Babb, Na [facility]  [facility]  [id number]

2 [facility]



        14:56:00                                                 [id number]   [facility]

 

        2022-08-10         Outpatient         Babb, Na [facility]  [facility]  [id number]

2 [facility]



        13:12:00                                                 [id number]   [facility]

 

        2022         Outpatient         Babb, Na [facility]  [facility]  [id number]

2 [facility]



        08:38:00                                                    [facility]

 

        2022         Outpatient         Babb, Na [facility]  [facility]  [id number]

2 [facility]



        14:16:00                                                 [id number]   [facility]

 

        2022         Outpatient         Babb, Na [facility]  [facility]  [id number]

2 [facility]



        14:01:34                                                 [id number]   [facility]

 

        2022         Outpatient         Babb, Na [facility]  [facility]  [id number]

2 [facility]



        13:44:11                                                 [id number]   [facility]

 

        2022         Outpatient         Babb, Na [facility]  [facility]  [id number]

2 [facility]



        13:39:45                                                 [id number]   [facility]

 

        2022         Outpatient         Babb, Na [facility]  [facility]  [id number]

2 [facility]



        12:29:08                                                 [id number]   [facility]

 

        2022         Outpatient         Babb, Na [facility]  [facility]  [id number]

2 [facility]



        12:27:34                                                 [id number]   [facility]

 

        2022         Outpatient         Babb, Na [facility]  [facility]  [id number]

2 [facility]



        12:04:29                                                 [id number]   [facility]

 

        2022         Outpatient         Babb, Na [facility]  [facility]  [id number]

2 [facility]



        12:02:37                                                 [id number]   [facility]

 

        2022         Outpatient         Babb, Na [facility]  [facility]  [id number]

2 [facility]



        11:35:04                                                 [id number]   [facility]

 

        2022         Outpatient         Babb, Na [facility]  [facility]  [id number]

2 [facility]



        11:20:33                                                 [id number]   [facility]

 

        2022         Outpatient         Babb, Na [facility]  [facility]  [id number]

2 [facility]



        11:05:46                                                 [id number]   [facility]

 

        2022         Outpatient         Babb, Na [facility]  [facility]  [id number]

2 [facility]



        11:04:11                                                 [id number]   [facility]

 

        2021         Outpatient                 [facility]     [id number] 

[place]



        11:41:32                                                         [facility]

 

        2021-10-19         Outpatient                 [facility]     [id number] 

[place]



        12:03:23                                                         [facility]

 

        2021-10-19         Outpatient                 [facility]     [id number] 

[place]



        12:01:39                                                         Health

 

        2021         [facility]         [facility]     [id number] 

[place]



        13:11:05                         [facility]

 

        2022 CAV             Jeronimo Vasquez [id number] 

[facility]



        15:30:00 16:30:00                         [id number]     

[facility]



                                                [phone] [id number]         



                                                [id number]                   

 

        2022-10-13 2022-10-13 Outpatient         [place]_[facility]     [id number]

[id number] [facility]



        00:00:00 00:00:00                                         [id number]   [facility]

l



                                                                        Group

 

        2022-10-06 2022-10-06 Outpatient         [place]_T [facility]     [facility]     [id number]

[id number] [facility]



        00:00:00 00:00:00                                            [facility]

l



                                                                        Group

 

        2022 OFFICE                  [facility]  [facility]  [id number] Co

mmon



        00:00:00 00:00:00 VISIT                                           [facility]



                        ESTAB PT                                         - [facility]



                        LEVEL 4                                         [facility]

 

        2022 (TEL)                   [facility]  [facility]  [id number] Co

mmon



        00:00:00 00:00:00                                                 [facility]

 

        2022-07-15 2022-07-15 Outpatient                 [facility]     [facility]     [id number]

[id number] [facility]



        08:03:00 08:03:00                                         [id number]   [facility]

l



                                                                        Group

 

        2022 (TEL)                   [facility]  [facility]  [id number] Co

mmon



        00:00:00 00:00:00                                                 [facility]

 

        2022 OFFICE                  [facility]  [facility]  [id number] Co

mmon



        00:00:00 00:00:00 VISIT                                           [facility]



                        ESTAB PT                                         - [facility]



                        LEVEL 4                                         [facility]

 

        2022 Outpatient                 [facility]     [facility]     [id number]

[id number] [facility]



        09:01:00 09:01:00                                         [id number]   [facility]

l



                                                                        Group

 

        2022 Outpatient                 [facility]     [facility]     [id number]

[facility]



        07:00:00 07:00:00                                         [id number]   Medica

l



                                                                        Group

 

        2022 OFFICE                  STLMLC  STLMLC  [id number] Co

mmon



        00:00:00 00:00:00 VISIT EST                                         Spir

it



                        PT LEVEL 3                                         - [facility]



                                                                        [facility]

 

        2022 Outpatient                 DMG     [facility]     [id number]

[facility]



        09:00:00 09:00:00                                         [id number]   Medica

l



                                                                        Group

 

        2022 (TEL)                   STLMLC  [facility]  [id number] Co

mmon



        00:00:00 00:00:00                                                 [facility]

 

        2022 (TEL)                   STLMLC  STLMLC  [id number] Co

mmon



        00:00:00 00:00:00                                                 Spirit



                                                                        - [facility]



                                                                        [facility]

 

        2022 OFFICE                  STLMLC  STLMLC  [id number] Co

mmon



        00:00:00 00:00:00 VISIT                                           Spirit



                        ESTAB PT                                         - [facility]



                        LEVEL 4                                         [facility]

 

        2021 (TEL)                   STLMLC  ST[facility]ML[facility]  [id number] Co

mmon



        00:00:00 00:00:00                                                 Spirit



                                                                        - [facility]



                                                                        [facility]

 

        2021 (TEL)                   STLMLC  STLMLC  [id number] Co

mmon



        00:00:00 00:00:00                                                 Spirit



                                                                        - [facility]



                                                                        [facility]

 

        2021 (COVID                  STLMLC  STLMLC  [id number] Co

mmon



        00:00:00 00:00:00 Inj) COVID                                         Spi

rit



                        Injection                                         - [facility]



                                                                        [facility]

 

        2021 OFFICE                  STLMLC  STLMLC  [id number] Co

mmon



        00:00:00 00:00:00 VISIT EST                                         Spir

it



                        PT LEVEL 3                                         - [facility]



                                                                        [facility]

 

        2021 SUB ANNUAL                 STLMLC  STLMLC  [id number]

 Common



        00:00:00 00:00:00 MCR                                             Spirit



                        WELLNESS                                         - [facility]



                        VISIT                                           [facility]

 

        2021 Office          ARMAND Choudhary [facility] [id number]

[place]



        12:20:17 12:23:23 Visit           Syed CASTRO    [id number]         Michael LONGO [id number]         



                                                [facility]  [phone]         



                                                        1               

 

        2021-10-19 2021-10-19 Office          ARMAND Choudhary [facility] [id number]

632 [place]



        11:37:35 12:08:38 Visit           Syed KNUTSON  [id number]         Michael QUIROZ 1 [id number]         



                                                        [phone]         



                                                        2               

 

        2021-10-12 2021-10-12 (TEL)                   STLMLC  STLMLC  [id number] Co

mmon



        00:00:00 00:00:00                                                 Spirit



                                                                         [facility]



                                                                        [facility]

 

        2021 (TEL)                   STLMLC  STLMLC  [id number] Co

mmon



        00:00:00 00:00:00                                                 [facility]

 

        2021 OFFICE                  STLMLC  STLMLC  [id number] Co

mmon



        00:00:00 00:00:00 VISIT EST                                         Spir

it



                        PT LEVEL 3                                         [facility]

 

        2021 (TEL)                   STLMLC  STLMLC  [id number] Co

mmon



        00:00:00 00:00:00                                                 [facility]

 

        2021 OFFICE                  STLMLC  STLMLC  [id number] Co

mmon



        00:00:00 00:00:00 VISIT EST                                         Spir

it



                        PT LEVEL 3                                         [facility]

 

        2021 Outpatient                 STLMLC  STLMLC  [id number]

 Common



        00:00:00 00:00:00                                                 [facility]

 

        2021 Outpatient                 STLMLC  STLMLC  [id number]

 Common



        00:00:00 00:00:00                                                 [facility]

 

        2021 Outpatient                 STLMLC  STLMLC  [id number]

 Common



        00:00:00 00:00:00                                                 [facility]

 

        2021 Outpatient                 STLMLC  STLMLC  [id number]

 Common



        00:00:00 00:00:00                                                 [facility]

 

        2021 Outpatient                 STLMLC  STLMLC  [id number]

 Common



        00:00:00 00:00:00                                                 [facility]

 

        2021 Outpatient                 STLMLC  STLMLC  [id number]

 Common



        00:00:00 00:00:00                                                 [facility]

 

        2021 Outpatient                 [facility]    [id number]

[id number] [facility]



        15:10:00 15:10:00                                                 ity [facility]

 

        2021 Outpatient                 STLMLC  STLMLC  [id number]

 Common



        00:00:00 00:00:00                                                 [facility]

 

        2021 Outpatient                 STLMLC  STLMLC  [id number]

 Common



        00:00:00 00:00:00                                                 [facility]

 

        2021 Outpatient ZONIA BUTLER, [facility]    [id number]

[id number] Univers



        15:10:00 15:10:00                 RENATO brandt [facility]

 

        2020 Outpatient         [place], [facility]   [id number]

0400 [facility]



        11:45:00 11:45:00                 Esdras                  50      

 

        2020 Outpatient         Topher, [facility]   [id number]

0400 [facility]



        10:15:00 10:15:00                 Esdras                  43      

 

        2020 Outpatient                 [facility]   [id number]

400 [facility]



        05:56:00 05:56:00                                         43      

 

        2020 Outpatient                 STLMLC  STLMLC  [id number]

 Common



        00:00:00 00:00:00                                                 [facility]

 

        2020 Outpatient Elective Topher, [facility]   

[id number] [facility]



        10:36:00 10:36:00                 Esdras                  03      

 

        2020 Outpatient                 STLMLC  STLMLC  [id number]

 Common



        00:00:00 00:00:00                                                 [facility]

 

        2020-10-19 2020-10-19 Outpatient                 STLMLC  STLMLC  [id number]

 Common



        00:00:00 00:00:00                                                 [facility]

 

        2020-10-12 2020-10-12 Outpatient                 STLMLC  STLMLC  [id number]

 Common



        00:00:00 00:00:00                                                 [facility]

 

        2020 Outpatient                 [facility] [address]

[id number] Common



        16:00:00 16:00:00                         t [address]         Spir

it



                                                Drive   [facility]

 

        2020 Outpatient                 [facility] [address]

[id number] Common



        13:45:00 13:45:00                         t [facility] [address]         [facility]

it



                                                Road    [facility]

 

        2020 Outpatient                 [facility] [address]

[id number] Common



        16:40:00 16:40:00                         t [address]   [address]         Spir

it



                                                Drive   [facility]

 

        2020 Outpatient                 [facility] [address]

[id number] Common



        16:00:00 16:00:00                         t [address]   [address]         Spir

it



                                                Drive   [facility]

 

        2020 Outpatient                 [facility] [address]

[id number] Common



        13:38:00 13:38:00                         t [address]   [address]         Spir

it



                                                Drive   [facility]

 

        2020 Outpatient                 [facility] [address]

[id number] Common



        14:56:00 14:56:00                         t [address]   [address]         Spir

it



                                                Drive   [facility]

 

        2019 Outpatient                 [facility] [address]

[id number] Common



        15:40:00 15:40:00                         t [address]   [address]         Spir

it



                                                Drive   [facility]

 

        2019 Outpatient                 [facility] [address]

[id number] Common



        11:01:00 11:01:00                         t [address]   [address]         Spir

it



                                                Drive   [facility]

 

        2019 Outpatient                 [facility]

[id number] Common



        10:42:00 10:42:00                         t [address]   [address]         Spir

it



                                                Drive   [facility]

 

        2019 Outpatient                 [facility] [address]

[id number] Common



        14:40:00 14:40:00                         t [address]   [address]         Spir

it



                                                Drive   [facility]

 

        2019 Outpatient                 [facility]

[id number] Common



        16:00:00 16:00:00                         t [address]   [address]         Spir

it



                                                Drive   [facility]

 

        2019 Office          Matias [facility]    [id number]

095 [facility]



        14:48:40 16:58:59 Visit           Nelson Garza 350.1.13.10      

   [place] [id number]         Jacobo Wren [phone]         Me

dical



                                                nal     [address]             [address]anch



                                                Building                 

 

        2019 Hospital         [facility]    [id number]

[id number] [facility]



        09:11:08 23:59:00 Encounter         Nelson Garza 350.1.13.10    

     ity of



                                                [place] [id number]         [place]

s



                                                Friona  [phone]         Medi

kevin



                                                        [address]             Branch

 

        2019 Orders          Doctor LOPES    [id number]

69 [facility]



        00:00:00 00:00:00 Only            Unassigned, YADIEL   350.1.13.10       

  ity of



                                        [facility] [id number]         Aba

as



                                                        [phone]         Medi

kevin



                                                        [address]             Branch

 

        2019 Outpatient                 [facility] [address]

[id number] Common



        15:55:00 15:55:00                         t [address]   [address]         Spir

it



                                                Drive   [facility]

 

        2019 Outpatient                 [facility] [address]

[id number] Common



        16:44:00 16:44:00                         t [address]   [address]         Spir

it



                                                Drive   Family          [facility]

 

        2019 Outpatient                 [facility] [address]

[id number] Common



        09:18:00 09:18:00                         t [address]   [address]         Spir

it



                                                Drive   Family          [facility]

 

        2019 Outpatient                 [facility]

[id number] Common



        15:00:00 15:00:00                         t [address]   [address]         Spir

it



                                                Drive   Family          [facility]

 

        2019 Outpatient                 [facility]

[id number] Common



        10:45:00 10:45:00                         t [address]   [address]         Spir

it



                                                Drive   Family          [facility]

 

        2019-01-10 2019-01-10 Outpatient                 [facility]

[id number] Common



        15:56:00 15:56:00                         t [address]   [address]         Spir

it



                                                Drive   Family          [facility]

 

        2019-01-10 2019-01-10 Outpatient                 [facility]

[id number] Common



        13:29:00 13:29:00                         t [address]   [address]         Spir

it



                                                Drive   [facility]

 

        2018 Outpatient                 STLMLC  STLMLC  [id number]

 Common



        00:00:00 00:00:00                                                 [facility]

 

        2018 Outpatient                 Tasha [address] 13

[id number] Common



        15:30:00 15:30:00                         t [address]         Spir

it



                                                Drive   [facility]







Results







           Test Description Test Time  Test Comments Results    Result Comments 

Source









                    Lipid Panel w/ Chol/HDL Ratio 2022 00:00:00 









                      Test Item  Value      Reference Range Interpretation Comme

nts









             Cholesterol, Total (test code = 2093-3) 138          100-199       

            

 

             Triglycerides (test code = 2571-8) 43           0-149              

       

 

             HDL Cholesterol (test code = 2085-9) 49           >39              

         

 

             T. Chol/HDL Ratio (test code = 9830-1) 2.8          0.0-5.0        

           



Microalbumin/Creat Ratio, Random [id number] 00:00:00





             Test Item    Value        Reference Range Interpretation Comments

 

             Creatinine, Urine (test code = 2161-8) 84.2         Not Estab.     

           

 

             Albumin, Urine (test code = 58576-4) 6.4          Not Estab.       

         

 

             Alb/Creat Ratio (test code = 24382-4) 8            0-29            

          



Hemoglobin [id number] 00:00:00





             Test Item    Value        Reference Range Interpretation Comments

 

             Hemoglobin A1c (test code = 4548-4) 6.9          4.8-5.6           

        



Comp. Metabolic Panel (14) (CMP)2022 00:00:00





             Test Item    Value        Reference Range Interpretation Comments

 

             Glucose (test code = 2345-7) 82           65-99                    

 

 

             BUN (test code = 3094-0) 20           8-27                      

 

             Creatinine (test code = 2160-0) 0.93         0.76-1.27             

    

 

             eGFR If NonAfricn Am (test code = 83            >59                

      



             02275-9)                                            

 

             eGFR If Africn Am (test code = 66143-6) 96            >59          

            

 

             BUN/Creatinine Ratio (test code = 22           10-24               

      



             3097-3)                                             

 

             Sodium (test code = 2951-2) 141          134-144                   

 

             Potassium (test code = 2823-3) 4.6          3.5-5.2                

   

 

             Chloride (test code = 2075-0) 105                            

  

 

             Carbon Dioxide, Total (test code =            20-29              

       



             -9)                                             

 

             Calcium (test code = 36028-9) 10.2         8.6-10.2                

  

 

             Protein, Total (test code = 2885-2) 6.8          6.0-8.5           

        

 

             Albumin (test code = 1751-7) 4.4          3.8-4.8                  

 

 

             Globulin, Total (test code = 60582-4) 2.4          1.5-4.5         

          

 

             A/G Ratio (test code = 1759-0) 1.8          1.2-2.2                

   

 

             Bilirubin, Total (test code = -2) 0.3          0.0-1.2         

          

 

             Alkaline Phosphatase (test code = 90                         

      



             6768-6)                                             

 

             AST (SGOT) (test code = 1920-8) 17           0-40                  

    

 

             ALT (SGPT) (test code = 1742-6) 16           0-44                  

    



CBC With Differential/[id number] 00:00:00





             Test Item    Value        Reference Range Interpretation Comments

 

             WBC (test code = 6690-2) 10.9         3.4-10.8                  

 

             RBC (test code = 789-8) 4.96         4.14-5.80                 

 

             Hemoglobin (test code = 718-7) 14.4         13.0-17.7              

   

 

             Hematocrit (test code = 4544-3) 43.4         37.5-51.0             

    

 

             MCV (test code = 787-2) 88           79-97                     

 

             MCH (test code = 785-6) 29.0         26.6-33.0                 

 

             MCHC (test code = 786-4) 33.2         31.5-35.7                 

 

             RDW (test code = 788-0) 14.7         11.6-15.4                 

 

             Platelets (test code = 777-3) 278          150-450                 

  

 

             Neutrophils (test code = 770-8) 32           Not Estab.            

    

 

             Lymphs (test code = 736-9) 53           Not Estab.                

 

             Monocytes (test code = 5905-5) 12           Not Estab.             

   

 

             Eos (test code = 713-8) 2            Not Estab.                

 

             Basos (test code = 706-2) 1            Not Estab.                

 

             Immature Cells (test code = UNLOINC)                               

         

 

             Neutrophils (Absolute) (test code = 3.5          1.4-7.0           

        



             751-8)                                              

 

             Lymphs (Absolute) (test code = 731-0) 5.8          0.7-3.1         

          

 

             Monocytes(Absolute) (test code = 742-7) 1.3          0.1-0.9       

            

 

             Eos (Absolute) (test code = 711-2) 0.2          0.0-0.4            

       

 

             Baso (Absolute) (test code = 704-7) 0.1          0.0-0.2           

        

 

             Immature Granulocytes (test code = 0            Not Estab.         

       



             23695-4)                                            

 

             Immature Grans (Abs) (test code = 0.0          0.0-0.1             

      



             07559-8)                                            

 

             NRBC (test code = 11233-4)                                        

 

             Hematology Comments: (test code = Note:                            

      



             82320-2)                                            



Glucose Ehcgfjrrqhe6915-03-71 11:29:00





             Test Item    Value        Reference Range Interpretation Comments

 

             Glucose Fingerstick 238 mg/dL                        

 JOSE



             (test code = WGLUC)                                        Fredi sales RN or MD



Glucose Kmfqvmgsphg3607-18-53 07:40:00





             Test Item    Value        Reference Range Interpretation Comments

 

             Glucose Fingerstick 134 mg/dL                        

 JOSE



             (test code = WGLUC)                                        Fredi sales RN or MD



Glucose Uyhgqyangke3387-79-35 17:46:00





             Test Item    Value        Reference Range Interpretation Comments

 

             Glucose Fingerstick 227 mg/dL                        

 Malia



             (test code = WGLUC)                                        Faustino riddle RN or MD



Glucose Technuflcpr7102-54-33 10:53:00





             Test Item    Value        Reference Range Interpretation Comments

 

             Glucose Fingerstick 83 mg/dL                         

 Nena



             (test code = WGLUC)                                        Juani



Glucose Ypsnbzzhgcl6669-85-19 07:04:00





             Test Item    Value        Reference Range Interpretation Comments

 

             Glucose Fingerstick 89 mg/dL                         

 MALDONADO CHENEYYULI



             (test code = WGLUC)                                        



MR BRAIN WO [id number] 15:42:56HISTORY: Tremors. Rule out subthalamic 
CVA. TECHNIQUE: Routine MRI of the brain was completed without contrastinjection
 technique. Examination includes sagittal T1/T2 FLAIR/3-D FSPGR,axial T2 FLAIR, 
DWI/ADC studies. From the 3-D imaging, subsequently axialand coronal 
reformations were generated. An additional gradient echo T2*axial study was also
 obtained. FINDINGS: Dual echo DWI images showed no focal areas of restricted 
protondiffusion, therefore, recent intracranial cytotoxic event is not suspecte
d.Gradient-echo T2 star images showed no focal areas of abnormal 
signalsuggestive of hemosiderin effect or abnormal mineral deposition in 
thebasal ganglia. T2 and FLAIR images showed several 2 mm to 2 cm size 
hyperintense lesionsin both periventricular as well as peripheral white matter 
involvingfrontal/parietal/occipital lobes. One small 3 mm lacunar infarction is 
seenin the white matter adjacent tothe atrium of left lateral ventricle. 
Ventricular system and cortical sulci are within normal limitsfor thepatient's 
age. No midline shift or abnormal fluid collection in theextra-axial compartment
 orpressure effect of the brain detected. Normal signal void of major 
intracranial vascular structures is preserved.Basal ganglia, brain stem and 
posterior fossa structures including internalauditory canals and mastoids appear
 normal. Pituitary gland, parasellar andsuprasellar regions, grossly orbits and
retro-orbital regions appearnormal. Note made of mild changes of chronic 
sinusitis involving all paranasalsinuses with retained secretions in the left 
maxillary sinus raisingconcern for superimposed mild acute left maxillary 
sinusitis. CONCLUSION:1. No acute intracranial findings.2. 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 
ventricle.3. Mild-to-moderate ischemic white matter degenerative changes in 
theperiventricular as well as peripheral white matter of both 
cerebralhemispheres, possibly due to combination of systemic hypertension as 
wellas small vessel disease. Please correlate with history. [facility], [facility] 
Results Inft User - 2019 10:43 AM CDTHISTORY: Tremors. Rule out 
subthalamic CVA.TECHNIQUE: Routine MRI of the brain was completed without co
ntrastinjection technique. Examination includes sagittal T1/T2 FLAIR/3-D 
FSPGR,axial T2 FLAIR, DWI/ADC studies. From the 3-D imaging, subsequently 
axialand coronal reformations were generated. An additional gradient echo 
T2*axial study was also obtained.FINDINGS: Dual echo DWI images showed no focal 
areas of restricted protondiffusion, therefore, recent intracranial cytotoxic 
event is not suspected.Gradient-echo T2 star images showed no focal areas of 
abnormal signalsuggestive of hemosiderin effect or abnormal mineral deposition 
in thebasal ganglia.T2 and FLAIR images showed several 2 mm to 2 cm size 
hyperintense lesionsin both periventricular as well as peripheral white matter 
involvingfrontal/parietal/occipital lobes. One small 3 mm lacunar infarction is 
seenin the white matter adjacent to the atrium of left lateral 
ventricle.Ventricular system and cortical sulci are within normal limits for th
epatient's age. No midline shift or abnormal fluid collection in theextra-axial 
compartment or pressure effect of the brain detected.Normal signal void of major
 intracranial vascular structures is preserved.Basal ganglia, brain stem and 
posterior fossa structures including internalauditory canals and mastoids appear
 normal. Pituitary gland, parasellar andsuprasellar regions, grossly orbits and 
retro-orbital regions appearnormal.Note made of mild changes of chronic 
sinusitis involving all paranasalsinuses with retained secretions in the left 
maxillary sinus raisingconcern for superimposed mild acuteleft maxillary 
sinusitis.CONCLUSION:1. No acute intracranial findings.2. 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
 the white matter of left occipital lobe near theatrium of lateral ventricle.3. 
Mild-to-moderate ischemic white matter degenerative changes in 
theperiventricular as well as peripheral white matter of both cer
ebralhemispheres, possibly due to combination of systemic hypertension as wellas
 small vessel disease. Please correlate with history.[facility]